# Patient Record
Sex: MALE | Race: WHITE | NOT HISPANIC OR LATINO
[De-identification: names, ages, dates, MRNs, and addresses within clinical notes are randomized per-mention and may not be internally consistent; named-entity substitution may affect disease eponyms.]

---

## 2017-01-04 ENCOUNTER — APPOINTMENT (OUTPATIENT)
Dept: ELECTROPHYSIOLOGY | Facility: CLINIC | Age: 82
End: 2017-01-04

## 2017-01-04 VITALS — OXYGEN SATURATION: 95 % | DIASTOLIC BLOOD PRESSURE: 57 MMHG | HEART RATE: 65 BPM | SYSTOLIC BLOOD PRESSURE: 97 MMHG

## 2017-01-17 ENCOUNTER — APPOINTMENT (OUTPATIENT)
Dept: PULMONOLOGY | Facility: CLINIC | Age: 82
End: 2017-01-17

## 2017-01-17 VITALS — DIASTOLIC BLOOD PRESSURE: 80 MMHG | SYSTOLIC BLOOD PRESSURE: 130 MMHG | HEART RATE: 75 BPM | OXYGEN SATURATION: 96 %

## 2017-03-08 ENCOUNTER — APPOINTMENT (OUTPATIENT)
Dept: ELECTROPHYSIOLOGY | Facility: CLINIC | Age: 82
End: 2017-03-08

## 2017-05-03 ENCOUNTER — APPOINTMENT (OUTPATIENT)
Dept: PULMONOLOGY | Facility: CLINIC | Age: 82
End: 2017-05-03

## 2017-05-03 VITALS
SYSTOLIC BLOOD PRESSURE: 120 MMHG | HEIGHT: 62 IN | DIASTOLIC BLOOD PRESSURE: 70 MMHG | HEART RATE: 79 BPM | OXYGEN SATURATION: 96 % | BODY MASS INDEX: 31.64 KG/M2

## 2017-05-03 VITALS — BODY MASS INDEX: 31.64 KG/M2 | WEIGHT: 173 LBS

## 2017-05-03 DIAGNOSIS — J96.11 CHRONIC RESPIRATORY FAILURE WITH HYPOXIA: ICD-10-CM

## 2017-05-03 RX ORDER — METHOCARBAMOL 500 MG/1
500 TABLET, FILM COATED ORAL
Qty: 30 | Refills: 0 | Status: DISCONTINUED | COMMUNITY
Start: 2017-02-23

## 2017-05-03 RX ORDER — DOXYCYCLINE 100 MG/1
100 CAPSULE ORAL
Qty: 8 | Refills: 0 | Status: DISCONTINUED | COMMUNITY
Start: 2016-12-10

## 2017-05-18 ENCOUNTER — APPOINTMENT (OUTPATIENT)
Dept: ELECTROPHYSIOLOGY | Facility: CLINIC | Age: 82
End: 2017-05-18

## 2017-05-18 VITALS — OXYGEN SATURATION: 96 % | SYSTOLIC BLOOD PRESSURE: 116 MMHG | DIASTOLIC BLOOD PRESSURE: 70 MMHG | HEART RATE: 74 BPM

## 2017-06-19 ENCOUNTER — RESULT REVIEW (OUTPATIENT)
Age: 82
End: 2017-06-19

## 2017-07-18 ENCOUNTER — APPOINTMENT (OUTPATIENT)
Dept: PULMONOLOGY | Facility: CLINIC | Age: 82
End: 2017-07-18

## 2017-07-18 VITALS
SYSTOLIC BLOOD PRESSURE: 110 MMHG | WEIGHT: 168 LBS | OXYGEN SATURATION: 96 % | DIASTOLIC BLOOD PRESSURE: 70 MMHG | HEART RATE: 72 BPM | BODY MASS INDEX: 30.91 KG/M2 | HEIGHT: 62 IN

## 2017-07-18 DIAGNOSIS — R09.89 OTHER SPECIFIED SYMPTOMS AND SIGNS INVOLVING THE CIRCULATORY AND RESPIRATORY SYSTEMS: ICD-10-CM

## 2017-07-18 DIAGNOSIS — J44.9 CHRONIC OBSTRUCTIVE PULMONARY DISEASE, UNSPECIFIED: ICD-10-CM

## 2017-07-18 DIAGNOSIS — Z87.891 PERSONAL HISTORY OF NICOTINE DEPENDENCE: ICD-10-CM

## 2017-07-18 DIAGNOSIS — R93.8 ABNORMAL FINDINGS ON DIAGNOSTIC IMAGING OF OTHER SPECIFIED BODY STRUCTURES: ICD-10-CM

## 2017-07-18 DIAGNOSIS — R06.02 SHORTNESS OF BREATH: ICD-10-CM

## 2017-07-18 DIAGNOSIS — G47.34 IDIOPATHIC SLEEP RELATED NONOBSTRUCTIVE ALVEOLAR HYPOVENTILATION: ICD-10-CM

## 2017-07-22 ENCOUNTER — INPATIENT (INPATIENT)
Facility: HOSPITAL | Age: 82
LOS: 11 days | Discharge: NURSING, REHAB OR LONG-TERM CARE | DRG: 871 | End: 2017-08-03
Attending: HOSPITALIST | Admitting: HOSPITALIST
Payer: MEDICARE

## 2017-07-22 VITALS
WEIGHT: 149.91 LBS | RESPIRATION RATE: 18 BRPM | TEMPERATURE: 92 F | DIASTOLIC BLOOD PRESSURE: 70 MMHG | OXYGEN SATURATION: 99 % | SYSTOLIC BLOOD PRESSURE: 102 MMHG | HEIGHT: 63 IN

## 2017-07-22 DIAGNOSIS — Z95.0 PRESENCE OF CARDIAC PACEMAKER: ICD-10-CM

## 2017-07-22 DIAGNOSIS — K43.9 VENTRAL HERNIA WITHOUT OBSTRUCTION OR GANGRENE: Chronic | ICD-10-CM

## 2017-07-22 DIAGNOSIS — N17.9 ACUTE KIDNEY FAILURE, UNSPECIFIED: ICD-10-CM

## 2017-07-22 DIAGNOSIS — T68.XXXA HYPOTHERMIA, INITIAL ENCOUNTER: ICD-10-CM

## 2017-07-22 DIAGNOSIS — I25.10 ATHEROSCLEROTIC HEART DISEASE OF NATIVE CORONARY ARTERY WITHOUT ANGINA PECTORIS: ICD-10-CM

## 2017-07-22 DIAGNOSIS — G30.8 OTHER ALZHEIMER'S DISEASE: ICD-10-CM

## 2017-07-22 DIAGNOSIS — R41.82 ALTERED MENTAL STATUS, UNSPECIFIED: ICD-10-CM

## 2017-07-22 DIAGNOSIS — Z90.79 ACQUIRED ABSENCE OF OTHER GENITAL ORGAN(S): Chronic | ICD-10-CM

## 2017-07-22 DIAGNOSIS — J44.9 CHRONIC OBSTRUCTIVE PULMONARY DISEASE, UNSPECIFIED: ICD-10-CM

## 2017-07-22 DIAGNOSIS — A41.9 SEPSIS, UNSPECIFIED ORGANISM: ICD-10-CM

## 2017-07-22 DIAGNOSIS — I10 ESSENTIAL (PRIMARY) HYPERTENSION: ICD-10-CM

## 2017-07-22 LAB
ALBUMIN SERPL ELPH-MCNC: 3.5 G/DL — SIGNIFICANT CHANGE UP (ref 3.3–5.2)
ALP SERPL-CCNC: 113 U/L — SIGNIFICANT CHANGE UP (ref 40–120)
ALT FLD-CCNC: 44 U/L — HIGH
ANION GAP SERPL CALC-SCNC: 16 MMOL/L — SIGNIFICANT CHANGE UP (ref 5–17)
ANISOCYTOSIS BLD QL: SLIGHT — SIGNIFICANT CHANGE UP
APPEARANCE UR: CLEAR — SIGNIFICANT CHANGE UP
APTT BLD: 40.4 SEC — HIGH (ref 27.5–37.4)
AST SERPL-CCNC: 34 U/L — SIGNIFICANT CHANGE UP
BACTERIA # UR AUTO: ABNORMAL
BASE EXCESS BLDA CALC-SCNC: -7.4 MMOL/L — LOW (ref -3–3)
BILIRUB SERPL-MCNC: 0.3 MG/DL — LOW (ref 0.4–2)
BILIRUB UR-MCNC: NEGATIVE — SIGNIFICANT CHANGE UP
BLOOD GAS COMMENTS ARTERIAL: SIGNIFICANT CHANGE UP
BUN SERPL-MCNC: 80 MG/DL — HIGH (ref 8–20)
CALCIUM SERPL-MCNC: 9.6 MG/DL — SIGNIFICANT CHANGE UP (ref 8.6–10.2)
CHLORIDE SERPL-SCNC: 112 MMOL/L — HIGH (ref 98–107)
CO2 SERPL-SCNC: 17 MMOL/L — LOW (ref 22–29)
COLOR SPEC: YELLOW — SIGNIFICANT CHANGE UP
CREAT SERPL-MCNC: 2.07 MG/DL — HIGH (ref 0.5–1.3)
DIFF PNL FLD: ABNORMAL
EPI CELLS # UR: SIGNIFICANT CHANGE UP
GAS PNL BLDA: SIGNIFICANT CHANGE UP
GLUCOSE SERPL-MCNC: 68 MG/DL — LOW (ref 70–115)
GLUCOSE UR QL: NEGATIVE MG/DL — SIGNIFICANT CHANGE UP
HCO3 BLDA-SCNC: 18 MMOL/L — LOW (ref 20–26)
HCT VFR BLD CALC: 39.2 % — LOW (ref 42–52)
HGB BLD-MCNC: 13.4 G/DL — LOW (ref 14–18)
HOROWITZ INDEX BLDA+IHG-RTO: 3 — SIGNIFICANT CHANGE UP
INR BLD: 1.13 RATIO — SIGNIFICANT CHANGE UP (ref 0.88–1.16)
KETONES UR-MCNC: NEGATIVE — SIGNIFICANT CHANGE UP
LACTATE BLDV-MCNC: 0.8 MMOL/L — SIGNIFICANT CHANGE UP (ref 0.5–2)
LEUKOCYTE ESTERASE UR-ACNC: ABNORMAL
LYMPHOCYTES # BLD AUTO: 6 % — LOW (ref 20–55)
MACROCYTES BLD QL: SLIGHT — SIGNIFICANT CHANGE UP
MCHC RBC-ENTMCNC: 31.8 PG — HIGH (ref 27–31)
MCHC RBC-ENTMCNC: 34.2 G/DL — SIGNIFICANT CHANGE UP (ref 32–36)
MCV RBC AUTO: 92.9 FL — SIGNIFICANT CHANGE UP (ref 80–94)
MICROCYTES BLD QL: SLIGHT — SIGNIFICANT CHANGE UP
MONOCYTES NFR BLD AUTO: 4 % — SIGNIFICANT CHANGE UP (ref 3–10)
NEUTROPHILS NFR BLD AUTO: 89 % — HIGH (ref 37–73)
NITRITE UR-MCNC: NEGATIVE — SIGNIFICANT CHANGE UP
PCO2 BLDA: 36 MMHG — SIGNIFICANT CHANGE UP (ref 35–45)
PH BLDA: 7.32 — LOW (ref 7.35–7.45)
PH UR: 5 — SIGNIFICANT CHANGE UP (ref 5–8)
PLAT MORPH BLD: NORMAL — SIGNIFICANT CHANGE UP
PLATELET # BLD AUTO: 87 K/UL — LOW (ref 150–400)
PO2 BLDA: 82 MMHG — LOW (ref 83–108)
POTASSIUM SERPL-MCNC: 5.4 MMOL/L — HIGH (ref 3.5–5.3)
POTASSIUM SERPL-SCNC: 5.4 MMOL/L — HIGH (ref 3.5–5.3)
PROT SERPL-MCNC: 7.3 G/DL — SIGNIFICANT CHANGE UP (ref 6.6–8.7)
PROT UR-MCNC: 15 MG/DL
PROTHROM AB SERPL-ACNC: 12.5 SEC — SIGNIFICANT CHANGE UP (ref 9.8–12.7)
RBC # BLD: 4.22 M/UL — LOW (ref 4.6–6.2)
RBC # FLD: 14.7 % — SIGNIFICANT CHANGE UP (ref 11–15.6)
RBC BLD AUTO: ABNORMAL
RBC CASTS # UR COMP ASSIST: SIGNIFICANT CHANGE UP /HPF (ref 0–4)
SAO2 % BLDA: 96 % — SIGNIFICANT CHANGE UP (ref 95–99)
SODIUM SERPL-SCNC: 145 MMOL/L — SIGNIFICANT CHANGE UP (ref 135–145)
SP GR SPEC: 1.02 — SIGNIFICANT CHANGE UP (ref 1.01–1.02)
UROBILINOGEN FLD QL: NEGATIVE MG/DL — SIGNIFICANT CHANGE UP
VARIANT LYMPHS # BLD: 1 % — SIGNIFICANT CHANGE UP (ref 0–6)
WBC # BLD: 8 K/UL — SIGNIFICANT CHANGE UP (ref 4.8–10.8)
WBC # FLD AUTO: 8 K/UL — SIGNIFICANT CHANGE UP (ref 4.8–10.8)
WBC UR QL: >50

## 2017-07-22 PROCEDURE — 93010 ELECTROCARDIOGRAM REPORT: CPT

## 2017-07-22 PROCEDURE — 71010: CPT | Mod: 26

## 2017-07-22 PROCEDURE — 70450 CT HEAD/BRAIN W/O DYE: CPT | Mod: 26

## 2017-07-22 PROCEDURE — 99291 CRITICAL CARE FIRST HOUR: CPT

## 2017-07-22 PROCEDURE — 99223 1ST HOSP IP/OBS HIGH 75: CPT | Mod: AI

## 2017-07-22 RX ORDER — SODIUM CHLORIDE 9 MG/ML
1000 INJECTION, SOLUTION INTRAVENOUS
Qty: 0 | Refills: 0 | Status: DISCONTINUED | OUTPATIENT
Start: 2017-07-22 | End: 2017-07-23

## 2017-07-22 RX ORDER — ENOXAPARIN SODIUM 100 MG/ML
30 INJECTION SUBCUTANEOUS EVERY 24 HOURS
Qty: 0 | Refills: 0 | Status: DISCONTINUED | OUTPATIENT
Start: 2017-07-22 | End: 2017-07-25

## 2017-07-22 RX ORDER — SODIUM CHLORIDE 9 MG/ML
500 INJECTION INTRAMUSCULAR; INTRAVENOUS; SUBCUTANEOUS
Qty: 0 | Refills: 0 | Status: COMPLETED | OUTPATIENT
Start: 2017-07-22 | End: 2017-07-22

## 2017-07-22 RX ORDER — SODIUM CHLORIDE 9 MG/ML
3 INJECTION INTRAMUSCULAR; INTRAVENOUS; SUBCUTANEOUS ONCE
Qty: 0 | Refills: 0 | Status: COMPLETED | OUTPATIENT
Start: 2017-07-22 | End: 2017-07-22

## 2017-07-22 RX ORDER — HYDROCORTISONE 20 MG
100 TABLET ORAL ONCE
Qty: 0 | Refills: 0 | Status: COMPLETED | OUTPATIENT
Start: 2017-07-22 | End: 2017-07-22

## 2017-07-22 RX ORDER — CEFTRIAXONE 500 MG/1
1 INJECTION, POWDER, FOR SOLUTION INTRAMUSCULAR; INTRAVENOUS ONCE
Qty: 0 | Refills: 0 | Status: COMPLETED | OUTPATIENT
Start: 2017-07-22 | End: 2017-07-22

## 2017-07-22 RX ORDER — AZITHROMYCIN 500 MG/1
500 TABLET, FILM COATED ORAL EVERY 24 HOURS
Qty: 0 | Refills: 0 | Status: DISCONTINUED | OUTPATIENT
Start: 2017-07-23 | End: 2017-07-25

## 2017-07-22 RX ORDER — AZITHROMYCIN 500 MG/1
500 TABLET, FILM COATED ORAL ONCE
Qty: 0 | Refills: 0 | Status: COMPLETED | OUTPATIENT
Start: 2017-07-22 | End: 2017-07-22

## 2017-07-22 RX ORDER — CEFTRIAXONE 500 MG/1
1 INJECTION, POWDER, FOR SOLUTION INTRAMUSCULAR; INTRAVENOUS EVERY 24 HOURS
Qty: 0 | Refills: 0 | Status: DISCONTINUED | OUTPATIENT
Start: 2017-07-23 | End: 2017-07-25

## 2017-07-22 RX ADMIN — SODIUM CHLORIDE 100 MILLILITER(S): 9 INJECTION, SOLUTION INTRAVENOUS at 23:45

## 2017-07-22 RX ADMIN — AZITHROMYCIN 255 MILLIGRAM(S): 500 TABLET, FILM COATED ORAL at 18:35

## 2017-07-22 RX ADMIN — SODIUM CHLORIDE 2000 MILLILITER(S): 9 INJECTION INTRAMUSCULAR; INTRAVENOUS; SUBCUTANEOUS at 15:06

## 2017-07-22 RX ADMIN — SODIUM CHLORIDE 100 MILLILITER(S): 9 INJECTION, SOLUTION INTRAVENOUS at 16:00

## 2017-07-22 RX ADMIN — SODIUM CHLORIDE 3 MILLILITER(S): 9 INJECTION INTRAMUSCULAR; INTRAVENOUS; SUBCUTANEOUS at 13:22

## 2017-07-22 RX ADMIN — SODIUM CHLORIDE 2000 MILLILITER(S): 9 INJECTION INTRAMUSCULAR; INTRAVENOUS; SUBCUTANEOUS at 13:16

## 2017-07-22 RX ADMIN — CEFTRIAXONE 100 GRAM(S): 500 INJECTION, POWDER, FOR SOLUTION INTRAMUSCULAR; INTRAVENOUS at 15:06

## 2017-07-22 RX ADMIN — SODIUM CHLORIDE 2000 MILLILITER(S): 9 INJECTION INTRAMUSCULAR; INTRAVENOUS; SUBCUTANEOUS at 15:01

## 2017-07-22 RX ADMIN — SODIUM CHLORIDE 2000 MILLILITER(S): 9 INJECTION INTRAMUSCULAR; INTRAVENOUS; SUBCUTANEOUS at 13:15

## 2017-07-22 RX ADMIN — Medication 100 MILLIGRAM(S): at 15:06

## 2017-07-22 RX ADMIN — SODIUM CHLORIDE 2000 MILLILITER(S): 9 INJECTION INTRAMUSCULAR; INTRAVENOUS; SUBCUTANEOUS at 13:56

## 2017-07-22 RX ADMIN — ENOXAPARIN SODIUM 30 MILLIGRAM(S): 100 INJECTION SUBCUTANEOUS at 23:46

## 2017-07-22 NOTE — ED ADULT NURSE NOTE - OBJECTIVE STATEMENT
Assumed patient care at 1454.  Family reports unable to stand X 2days due to generalized weakness.  Decreased PO intake over the past 2-3 days.  Clear bilateral lung sounds.  Abdomen soft, nontender, nondistended with positive bowel sounds X 4 quadrants.  Able to move all four extremities with equal strength.  Alert to person and time but not place or situation.  Hx of dementia.  Warming blanket in place.

## 2017-07-22 NOTE — H&P ADULT - HISTORY OF PRESENT ILLNESS
85yr old male with PMH of HTN. Dementia, Pacemaker for arrythmia, ?COPD was brought by wife to ER due to altered consciousness. At the time of my interview, no family available to provide details,  information obtained from ER physician and EMR. Pt obtunded, unable to provide any history.   In the ER , he was found to be hypothermic to 93F and Tachypneic, hypoxic on RA improved later. CXR suspicious for rt and LLL infiltrates, UA - dirty. He is being admitted for Sepsis with AMS possibly 2/2 pneumonia/ UTI. Hammonds was inserted in ER. CT head not done at the time of interview.

## 2017-07-22 NOTE — ED PROVIDER NOTE - OBJECTIVE STATEMENT
86 yo male hx of COPD and heart disease; has been home from rehab for approx 4 months, doing reasonably well; requires a lot of assitance with ADLs but can ambulate with walker, and usually appropriately interactive; family states over last three days has become exceedingly weak, tired, unable to get out of bed, not eating, not drinking; patient with mild dementia, hx otherwise limited.

## 2017-07-22 NOTE — INPATIENT CERTIFICATION FOR MEDICARE PATIENTS - RISKS OF ADVERSE EVENTS
Concern for neurologic deterioration/Concern for worsening infectious process/Concern for renal deterioration/Concern for delay in diagnosis and treatment

## 2017-07-22 NOTE — ED PROVIDER NOTE - MEDICAL DECISION MAKING DETAILS
will treat for presumed sepsis, checking tsh and treating for possible adrenal insufficency; plan to admit

## 2017-07-22 NOTE — ED ADULT NURSE NOTE - PSH
Hernia, epigastric    History of transurethral prostatectomy  transurethral needle ablation /TUNA of prostate 4 years ago

## 2017-07-22 NOTE — ED PROVIDER NOTE - CARE PLAN
Principal Discharge DX:	Hypothermia, initial encounter  Secondary Diagnosis:	Sepsis, due to unspecified organism

## 2017-07-22 NOTE — H&P ADULT - NSHPPHYSICALEXAM_GEN_ALL_CORE
PHYSICAL EXAM:    GENERAL: obtunded, not arousable to sternal rub , barely opens eyes  HEAD:  Atraumatic, Normocephalic  EYES: NAI conjunctiva and sclera clear  ENMT: dry mucous membranes   NECK: Supple, No JVD  NERVOUS SYSTEM:  Alert & Oriented X0, obtunded  CHEST/LUNG: Clear to percussion bilaterally; No rales, rhonchi, wheezing, or rubs  HEART: Regular rate and rhythm; No murmurs, rubs, or gallops  ABDOMEN: Soft, Nondistended; Bowel sounds present  EXTREMITIES:   No clubbing, cyanosis, or edema  SKIN: No obvious rashes or lesions

## 2017-07-22 NOTE — ED PROVIDER NOTE - CRITICAL CARE PROVIDED
documentation/additional history taking/interpretation of diagnostic studies/consultation with other physicians/consult w/ pt's family directly relating to pts condition/direct patient care (not related to procedure)

## 2017-07-22 NOTE — H&P ADULT - PROBLEM SELECTOR PLAN 3
as above  f/u cuiltures  ct rocephin , azithromycin for possible CAP/ UTI  ct hess to monitor urine output for now as above  f/u cultures  ct rocephin , azithromycin for possible CAP/ UTI  ct hess to monitor urine output for now

## 2017-07-22 NOTE — ED ADULT NURSE NOTE - PMH
CAD (coronary artery disease)    COPD (chronic obstructive pulmonary disease)    HLD (hyperlipidemia)    HTN (hypertension) CAD (coronary artery disease)    COPD (chronic obstructive pulmonary disease)    Dementia    HLD (hyperlipidemia)    HTN (hypertension)    Pacemaker

## 2017-07-22 NOTE — ED ADULT NURSE REASSESSMENT NOTE - NS ED NURSE REASSESS COMMENT FT1
Pt sleeping, arousable with deep tactile stimulation. Mental status unchanged since arrival.  Pt alert to person and year but not to place or situation.  Follows commands.  Turned and positioned.  Cleaned and changed.  respirations even and unlabored.  Oxygen saturation 94% with 3 liters O2 via NC.  As per family, pt uses nasal cannula at night 1-2 liters. IV fluids infusing well through patent IV catheter in Left arm. Awaiting admission orders.

## 2017-07-22 NOTE — ED PROVIDER NOTE - PMH
CAD (coronary artery disease)    COPD (chronic obstructive pulmonary disease)    HLD (hyperlipidemia)    HTN (hypertension)

## 2017-07-22 NOTE — H&P ADULT - PROBLEM SELECTOR PLAN 1
Will get stat CT head - if neg for acute findings  will admit to tele +  bed  stat ABG r/o hypercarbia  Discussed with ER anttending  ct antibiotics for possible UTI/Pneumonia with toxic metabolic encephalopathy  check repeat Lactate once warmed  monitor vitals

## 2017-07-22 NOTE — H&P ADULT - PROBLEM SELECTOR PROBLEM 8
Coronary artery disease involving native coronary artery of native heart without angina pectoris Essential hypertension

## 2017-07-22 NOTE — H&P ADULT - PMH
CAD (coronary artery disease)    COPD (chronic obstructive pulmonary disease)    Dementia    HLD (hyperlipidemia)    HTN (hypertension)    Pacemaker

## 2017-07-23 LAB
ALBUMIN SERPL ELPH-MCNC: 2.9 G/DL — LOW (ref 3.3–5.2)
ALP SERPL-CCNC: 97 U/L — SIGNIFICANT CHANGE UP (ref 40–120)
ALT FLD-CCNC: 37 U/L — SIGNIFICANT CHANGE UP
ANION GAP SERPL CALC-SCNC: 12 MMOL/L — SIGNIFICANT CHANGE UP (ref 5–17)
ANISOCYTOSIS BLD QL: SLIGHT — SIGNIFICANT CHANGE UP
AST SERPL-CCNC: 36 U/L — SIGNIFICANT CHANGE UP
BILIRUB SERPL-MCNC: 0.2 MG/DL — LOW (ref 0.4–2)
BUN SERPL-MCNC: 58 MG/DL — HIGH (ref 8–20)
CALCIUM SERPL-MCNC: 8.4 MG/DL — LOW (ref 8.6–10.2)
CHLORIDE SERPL-SCNC: 119 MMOL/L — HIGH (ref 98–107)
CO2 SERPL-SCNC: 17 MMOL/L — LOW (ref 22–29)
CREAT SERPL-MCNC: 1.65 MG/DL — HIGH (ref 0.5–1.3)
GLUCOSE SERPL-MCNC: 81 MG/DL — SIGNIFICANT CHANGE UP (ref 70–115)
HCT VFR BLD CALC: 34.3 % — LOW (ref 42–52)
HGB BLD-MCNC: 11.4 G/DL — LOW (ref 14–18)
HYPOCHROMIA BLD QL: SLIGHT — SIGNIFICANT CHANGE UP
LYMPHOCYTES # BLD AUTO: 5 % — LOW (ref 20–55)
MACROCYTES BLD QL: SLIGHT — SIGNIFICANT CHANGE UP
MCHC RBC-ENTMCNC: 30.8 PG — SIGNIFICANT CHANGE UP (ref 27–31)
MCHC RBC-ENTMCNC: 33.2 G/DL — SIGNIFICANT CHANGE UP (ref 32–36)
MCV RBC AUTO: 92.7 FL — SIGNIFICANT CHANGE UP (ref 80–94)
MICROCYTES BLD QL: SLIGHT — SIGNIFICANT CHANGE UP
MONOCYTES NFR BLD AUTO: 4 % — SIGNIFICANT CHANGE UP (ref 3–10)
NEUTROPHILS NFR BLD AUTO: 89 % — HIGH (ref 37–73)
OVALOCYTES BLD QL SMEAR: SLIGHT — SIGNIFICANT CHANGE UP
PLAT MORPH BLD: NORMAL — SIGNIFICANT CHANGE UP
PLATELET # BLD AUTO: 83 K/UL — LOW (ref 150–400)
POIKILOCYTOSIS BLD QL AUTO: SLIGHT — SIGNIFICANT CHANGE UP
POTASSIUM SERPL-MCNC: 5 MMOL/L — SIGNIFICANT CHANGE UP (ref 3.5–5.3)
POTASSIUM SERPL-SCNC: 5 MMOL/L — SIGNIFICANT CHANGE UP (ref 3.5–5.3)
PROT SERPL-MCNC: 6.1 G/DL — LOW (ref 6.6–8.7)
RBC # BLD: 3.7 M/UL — LOW (ref 4.6–6.2)
RBC # FLD: 14.6 % — SIGNIFICANT CHANGE UP (ref 11–15.6)
RBC BLD AUTO: ABNORMAL
SODIUM SERPL-SCNC: 148 MMOL/L — HIGH (ref 135–145)
VARIANT LYMPHS # BLD: 2 % — SIGNIFICANT CHANGE UP (ref 0–6)
WBC # BLD: 6.4 K/UL — SIGNIFICANT CHANGE UP (ref 4.8–10.8)
WBC # FLD AUTO: 6.4 K/UL — SIGNIFICANT CHANGE UP (ref 4.8–10.8)

## 2017-07-23 PROCEDURE — 99233 SBSQ HOSP IP/OBS HIGH 50: CPT

## 2017-07-23 RX ORDER — SODIUM CHLORIDE 9 MG/ML
1000 INJECTION, SOLUTION INTRAVENOUS
Qty: 0 | Refills: 0 | Status: DISCONTINUED | OUTPATIENT
Start: 2017-07-23 | End: 2017-07-25

## 2017-07-23 RX ORDER — IPRATROPIUM/ALBUTEROL SULFATE 18-103MCG
3 AEROSOL WITH ADAPTER (GRAM) INHALATION EVERY 6 HOURS
Qty: 0 | Refills: 0 | Status: DISCONTINUED | OUTPATIENT
Start: 2017-07-23 | End: 2017-08-03

## 2017-07-23 RX ADMIN — ENOXAPARIN SODIUM 30 MILLIGRAM(S): 100 INJECTION SUBCUTANEOUS at 23:49

## 2017-07-23 RX ADMIN — SODIUM CHLORIDE 100 MILLILITER(S): 9 INJECTION, SOLUTION INTRAVENOUS at 14:33

## 2017-07-23 RX ADMIN — CEFTRIAXONE 100 GRAM(S): 500 INJECTION, POWDER, FOR SOLUTION INTRAMUSCULAR; INTRAVENOUS at 18:27

## 2017-07-23 RX ADMIN — AZITHROMYCIN 255 MILLIGRAM(S): 500 TABLET, FILM COATED ORAL at 18:27

## 2017-07-23 NOTE — PROGRESS NOTE ADULT - SUBJECTIVE AND OBJECTIVE BOX
TONIA ABDUL    [id number]    85y      Male    CC: AMS, weakness, lethargy found to be hypothermic yesterday      INTERVAL HPI/OVERNIGHT EVENTS: no acute events    REVIEW OF SYSTEMS:  per wife    CONSTITUTIONAL: No fever  RESPIRATORY: No cough,No shortness of breath  GASTROINTESTINAL: No abdominal or epigastric pain. No nausea, vomiting  :      Vital Signs Last 24 Hrs  T(C): 36.4 (2017 07:39), Max: 36.5 (2017 04:51)  T(F): 97.5 (2017 07:39), Max: 97.7 (2017 04:51)  HR: 59 (2017 12:31) (59 - 70)  BP: 110/68 (2017 12:31) (84/66 - 117/62)  BP(mean): 71 (2017 08:18) (71 - 71)  RR: 13 (2017 12:31) (13 - 20)  SpO2: 94% (2017 12:31) (89% - 96%)    PHYSICAL EXAM:    GENERAL: lethargiv opens eyes - follows commands inconsistently  HEENT: PERRL, +EOMI  CHEST/LUNG: Clear to percussion bilaterally; No wheezing  HEART: S1S2+, Regular rate and rhythm; No murmurs, rubs, or gallops  ABDOMEN: Soft, Nontender, Nondistended; Bowel sounds present  EXTREMITIES:  No clubbing, cyanosis, or edema  SKIN: No rashes or lesions  NEURO: AAOX1-2 - to self /wife       @ : @ 07:00  --------------------------------------------------------  IN: 3300 mL / OUT: 2050 mL / NET: 1250 mL     @ : @ 13:42  --------------------------------------------------------  IN: 200 mL / OUT: 100 mL / NET: 100 mL        LABS:                        11.4   6.4   )-----------( 83       ( 2017 05:48 )             34.3     -    148<H>  |  119<H>  |  58.0<H>  ----------------------------<  81  5.0   |  17.0<L>  |  1.65<H>    Ca    8.4<L>      2017 05:48    TPro  6.1<L>  /  Alb  2.9<L>  /  TBili  0.2<L>  /  DBili  x   /  AST  36  /  ALT  37  /  AlkPhos  97  -    PT/INR - ( 2017 13:14 )   PT: 12.5 sec;   INR: 1.13 ratio         PTT - ( 2017 13:14 )  PTT:40.4 sec  Urinalysis Basic - ( 2017 15:45 )    Color: Yellow / Appearance: Clear / S.020 / pH: x  Gluc: x / Ketone: Negative  / Bili: Negative / Urobili: Negative mg/dL   Blood: x / Protein: 15 mg/dL / Nitrite: Negative   Leuk Esterase: Moderate / RBC: 0-2 /HPF / WBC >50   Sq Epi: x / Non Sq Epi: x / Bacteria: Few          MEDICATIONS  (STANDING):  cefTRIAXone   IVPB 1 Gram(s) IV Intermittent every 24 hours  azithromycin  IVPB 500 milliGRAM(s) IV Intermittent every 24 hours  enoxaparin Injectable 30 milliGRAM(s) SubCutaneous every 24 hours  sodium chloride 0.45%. 1000 milliLiter(s) (100 mL/Hr) IV Continuous <Continuous>    MEDICATIONS  (PRN):      RADIOLOGY & ADDITIONAL TESTS:  CT head - reviewed

## 2017-07-23 NOTE — PROGRESS NOTE ADULT - ASSESSMENT
85yr old male with PMH of HTN. Dementia, Pacemaker for arrythmia, ?COPD was brought by wife to ER due to altered consciousness. At the time of my interview, no family available to provide details,  information obtained from ER physician and EMR. Pt obtunded, unable to provide any history.   In the ER , he was found to be hypothermic to 93F and Tachypneic, hypoxic on RA improved later. CXR suspicious for rt and LLL infiltrates, UA - dirty. He is being admitted for Sepsis with AMS possibly 2/2 pneumonia/ UTI. Hammonds was inserted in ER. CT head neg for acute infarct. some awake today

## 2017-07-24 LAB
ANION GAP SERPL CALC-SCNC: 14 MMOL/L — SIGNIFICANT CHANGE UP (ref 5–17)
BUN SERPL-MCNC: 34 MG/DL — HIGH (ref 8–20)
CALCIUM SERPL-MCNC: 8.6 MG/DL — SIGNIFICANT CHANGE UP (ref 8.6–10.2)
CHLORIDE SERPL-SCNC: 114 MMOL/L — HIGH (ref 98–107)
CO2 SERPL-SCNC: 17 MMOL/L — LOW (ref 22–29)
CREAT SERPL-MCNC: 1.26 MG/DL — SIGNIFICANT CHANGE UP (ref 0.5–1.3)
GLUCOSE SERPL-MCNC: 70 MG/DL — SIGNIFICANT CHANGE UP (ref 70–115)
HCT VFR BLD CALC: 37 % — LOW (ref 42–52)
HGB BLD-MCNC: 12.3 G/DL — LOW (ref 14–18)
MAGNESIUM SERPL-MCNC: 1.4 MG/DL — LOW (ref 1.6–2.6)
MCHC RBC-ENTMCNC: 31.4 PG — HIGH (ref 27–31)
MCHC RBC-ENTMCNC: 33.2 G/DL — SIGNIFICANT CHANGE UP (ref 32–36)
MCV RBC AUTO: 94.4 FL — HIGH (ref 80–94)
PHOSPHATE SERPL-MCNC: 2 MG/DL — LOW (ref 2.4–4.7)
PLATELET # BLD AUTO: 85 K/UL — LOW (ref 150–400)
POTASSIUM SERPL-MCNC: 4.8 MMOL/L — SIGNIFICANT CHANGE UP (ref 3.5–5.3)
POTASSIUM SERPL-SCNC: 4.8 MMOL/L — SIGNIFICANT CHANGE UP (ref 3.5–5.3)
RBC # BLD: 3.92 M/UL — LOW (ref 4.6–6.2)
RBC # FLD: 14.4 % — SIGNIFICANT CHANGE UP (ref 11–15.6)
SODIUM SERPL-SCNC: 145 MMOL/L — SIGNIFICANT CHANGE UP (ref 135–145)
WBC # BLD: 5.8 K/UL — SIGNIFICANT CHANGE UP (ref 4.8–10.8)
WBC # FLD AUTO: 5.8 K/UL — SIGNIFICANT CHANGE UP (ref 4.8–10.8)

## 2017-07-24 PROCEDURE — 93306 TTE W/DOPPLER COMPLETE: CPT | Mod: 26

## 2017-07-24 PROCEDURE — 99223 1ST HOSP IP/OBS HIGH 75: CPT

## 2017-07-24 PROCEDURE — 99233 SBSQ HOSP IP/OBS HIGH 50: CPT

## 2017-07-24 PROCEDURE — 93010 ELECTROCARDIOGRAM REPORT: CPT

## 2017-07-24 RX ORDER — NYSTATIN CREAM 100000 [USP'U]/G
1 CREAM TOPICAL
Qty: 0 | Refills: 0 | Status: DISCONTINUED | OUTPATIENT
Start: 2017-07-24 | End: 2017-08-03

## 2017-07-24 RX ORDER — MAGNESIUM OXIDE 400 MG ORAL TABLET 241.3 MG
400 TABLET ORAL
Qty: 0 | Refills: 0 | Status: DISCONTINUED | OUTPATIENT
Start: 2017-07-24 | End: 2017-08-03

## 2017-07-24 RX ORDER — MAGNESIUM SULFATE 500 MG/ML
2 VIAL (ML) INJECTION ONCE
Qty: 0 | Refills: 0 | Status: COMPLETED | OUTPATIENT
Start: 2017-07-24 | End: 2017-07-24

## 2017-07-24 RX ORDER — SODIUM CHLORIDE 9 MG/ML
500 INJECTION INTRAMUSCULAR; INTRAVENOUS; SUBCUTANEOUS ONCE
Qty: 0 | Refills: 0 | Status: COMPLETED | OUTPATIENT
Start: 2017-07-24 | End: 2017-07-24

## 2017-07-24 RX ADMIN — NYSTATIN CREAM 1 APPLICATION(S): 100000 CREAM TOPICAL at 17:18

## 2017-07-24 RX ADMIN — ENOXAPARIN SODIUM 30 MILLIGRAM(S): 100 INJECTION SUBCUTANEOUS at 22:38

## 2017-07-24 RX ADMIN — AZITHROMYCIN 255 MILLIGRAM(S): 500 TABLET, FILM COATED ORAL at 17:56

## 2017-07-24 RX ADMIN — MAGNESIUM OXIDE 400 MG ORAL TABLET 400 MILLIGRAM(S): 241.3 TABLET ORAL at 17:27

## 2017-07-24 RX ADMIN — CEFTRIAXONE 100 GRAM(S): 500 INJECTION, POWDER, FOR SOLUTION INTRAMUSCULAR; INTRAVENOUS at 17:18

## 2017-07-24 RX ADMIN — Medication 50 GRAM(S): at 15:20

## 2017-07-24 RX ADMIN — SODIUM CHLORIDE 2000 MILLILITER(S): 9 INJECTION INTRAMUSCULAR; INTRAVENOUS; SUBCUTANEOUS at 14:17

## 2017-07-24 RX ADMIN — SODIUM CHLORIDE 100 MILLILITER(S): 9 INJECTION, SOLUTION INTRAVENOUS at 00:14

## 2017-07-24 RX ADMIN — SODIUM CHLORIDE 100 MILLILITER(S): 9 INJECTION, SOLUTION INTRAVENOUS at 22:39

## 2017-07-24 NOTE — PHYSICAL THERAPY INITIAL EVALUATION ADULT - ADDITIONAL COMMENTS
pt.'s social and PLOF history obtained from the care coordination document; unable to obtain from pt.

## 2017-07-24 NOTE — SWALLOW BEDSIDE ASSESSMENT ADULT - ORAL PHASE
Delayed oral transit time/oral holding due to reduced cognition oral holding due to reduced cognition/Delayed oral transit time Within functional limits

## 2017-07-24 NOTE — PHYSICAL THERAPY INITIAL EVALUATION ADULT - DISCHARGE DISPOSITION, PT EVAL
[facility] vs. Home with assist & PT (if family able to provide Min. A with all functional mobility)/rehabilitation facility

## 2017-07-24 NOTE — CONSULT NOTE ADULT - SUBJECTIVE AND OBJECTIVE BOX
Patient is a 85y old  Male who presents with a chief complaint of feeling generally ill (2017 23:06)      HPI:  85yr old male with PMH of HTN. Dementia, Pacemaker for arrythmia, ?COPD was brought by wife to ER due to altered consciousness. At the time of my interview, no family available to provide details,  information obtained from ER physician and EMR. Pt obtunded, unable to provide any history.   In the ER , he was found to be hypothermic to 93F and Tachypneic, hypoxic on RA improved later. CXR suspicious for rt and LLL infiltrates, UA - dirty. He is being admitted for Sepsis with AMS possibly 2/2 pneumonia/ UTI. Hammonds was inserted in ER. CT head not done at the time of interview. (2017 18:27)      PAST MEDICAL & SURGICAL HISTORY:  Pacemaker  Dementia  CAD (coronary artery disease)  HLD (hyperlipidemia)  HTN (hypertension)  COPD (chronic obstructive pulmonary disease)  History of transurethral prostatectomy: transurethral needle ablation /TUNA of prostate 4 years ago  Hernia, epigastric      PREVIOUS DIAGNOSTIC TESTING:      ECHO  FINDINGS:        CATHETERIZATION  FINDINGS:      Allergies    Sulfur Precipitated (Hives)    Intolerances        MEDICATIONS  (STANDING):  cefTRIAXone   IVPB 1 Gram(s) IV Intermittent every 24 hours  azithromycin  IVPB 500 milliGRAM(s) IV Intermittent every 24 hours  enoxaparin Injectable 30 milliGRAM(s) SubCutaneous every 24 hours  sodium chloride 0.45%. 1000 milliLiter(s) (100 mL/Hr) IV Continuous <Continuous>  magnesium oxide 400 milliGRAM(s) Oral three times a day with meals  nystatin Powder 1 Application(s) Topical two times a day    MEDICATIONS  (PRN):  ALBUTerol/ipratropium for Nebulization 3 milliLiter(s) Nebulizer every 6 hours PRN Shortness of Breath and/or Wheezing      FAMILY HISTORY:  No pertinent family history in first degree relatives      SOCIAL HISTORY:    CIGARETTES:    ALCOHOL:    REVIEW OF SYSTEMS:  CONSTITUTIONAL: No fever, weight loss, or fatigue  EYES: No eye pain, visual disturbances, or discharge  ENMT:  No difficulty hearing, tinnitus, vertigo; No sinus or throat pain  NECK: No pain or stiffness  RESPIRATORY: No cough, wheezing, chills or hemoptysis; No Shortness of Breath  CARDIOVASCULAR: No chest pain, palpitations, passing out, dizziness, or leg swelling  GASTROINTESTINAL: No abdominal or epigastric pain. No nausea, vomiting, or hematemesis; No diarrhea or constipation. No melena or hematochezia.  GENITOURINARY: No dysuria, frequency, hematuria, or incontinence  NEUROLOGICAL: No headaches, memory loss, loss of strength, numbness, or tremors  SKIN: No itching, burning, rashes, or lesions   LYMPH Nodes: No enlarged glands  ENDOCRINE: No heat or cold intolerance; No hair loss  MUSCULOSKELETAL: No joint pain or swelling; No muscle, back, or extremity pain  PSYCHIATRIC: No depression, anxiety, mood swings, or difficulty sleeping  HEME/LYMPH: No easy bruising, or bleeding gums  ALLERY AND IMMUNOLOGIC: No hives or eczema	    Vital Signs Last 24 Hrs  T(C): 36.3 (2017 16:08), Max: 37.1 (2017 04:48)  T(F): 97.4 (2017 16:08), Max: 98.7 (2017 04:48)  HR: 59 (2017 15:23) (59 - 67)  BP: 119/78 (2017 15:23) (74/48 - 119/78)  BP(mean): --  RR: 15 (2017 15:23) (14 - 22)  SpO2: 95% (2017 15:23) (92% - 98%)    Daily     Daily Weight in k.5 (2017 10:53)    I&O's Detail    2017 07:01  -  2017 07:00  --------------------------------------------------------  IN:    dextrose 5% + sodium chloride 0.45%.: 400 mL    IV PiggyBack: 300 mL    Oral Fluid: 120 mL    sodium chloride 0.45%.: 1600 mL  Total IN: 2420 mL    OUT:    Indwelling Catheter - Urethral: 3000 mL  Total OUT: 3000 mL    Total NET: -580 mL      2017 07:01  -  2017 16:41  --------------------------------------------------------  IN:    IV PiggyBack: 50 mL    Oral Fluid: 220 mL    sodium chloride 0.45%.: 700 mL    Solution: 500 mL  Total IN: 1470 mL    OUT:    Indwelling Catheter - Urethral: 575 mL  Total OUT: 575 mL    Total NET: 895 mL          PHYSICAL EXAM:  Appearance: Normal, well nourished	  HEENT:   Normal oral mucosa, PERRL, EOMI, sclera non-icteric	  Lymphatic: No cervical lymphadenopathy  Cardiovascular: Normal S1 S2, No JVD, No cardiac murmurs, No carotid bruits, No peripheral edema  Respiratory: Lungs clear to auscultation	  Psychiatry: A & O x 3, Mood & affect appropriate  Gastrointestinal:  Soft, Non-tender, + BS, no bruits	  Skin: No rashes, No ecchymoses, No cyanosis  Neurologic: Grossly non-focal with full strength in all four extremities  Extremities: Normal range of motion, No clubbing, cyanosis or edema  Vascular: Peripheral pulses palpable 2+ bilaterally      INTERPRETATION OF TELEMETRY:    ECG:    LABS:                        12.3   5.8   )-----------( 85       ( 2017 07:16 )             37.0     07-    145  |  114<H>  |  34.0<H>  ----------------------------<  70  4.8   |  17.0<L>  |  1.26    Ca    8.6      2017 07:16  Phos  2.0       Mg     1.4         TPro  6.1<L>  /  Alb  2.9<L>  /  TBili  0.2<L>  /  DBili  x   /  AST  36  /  ALT  37  /  AlkPhos  97              I&O's Summary    2017 07:  -  2017 07:00  --------------------------------------------------------  IN: 2420 mL / OUT: 3000 mL / NET: -580 mL    2017 07:  -  2017 16:41  --------------------------------------------------------  IN: 1470 mL / OUT: 575 mL / NET: 895 mL      BNP  RADIOLOGY & ADDITIONAL STUDIES: Patient is a 85y old  Male who presents with a chief complaint of feeling generally ill (2017 23:06)      HPI:  85yr old male with PMH of HTN. 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 Patient started on broad spectrum abx with clinical improvement. On telemetry episodes of ApVP with safety pacing due to PVCs in blanking period. PPM interrogation also noted for short   runs of  NSVT.  Patient currently with limited functional status >4 METs but uses walker for gait support. Pacemaker interrogation reveals normal device function. patient currently Ao x3 . He deneis; fever , chills,  chest pain orthopnea , PND (+) b/l trace edema (+) polyuria x 1 week.  All systems reviewed otherwise negative except as above.       PAST MEDICAL & SURGICAL HISTORY:  Pacemaker  Dementia  CAD (coronary artery disease)  HLD (hyperlipidemia)  HTN (hypertension)  COPD (chronic obstructive pulmonary disease)  History of transurethral prostatectomy: transurethral needle ablation /TUNA of prostate 4 years ago  Hernia, epigastric      PREVIOUS DIAGNOSTIC TESTING:      ECHO  FINDINGS:< from: TTE Echo Complete w/Doppler (16 @ 11:45) >     Summary:   1. The left atrium is normal in size.   2. Normal wall motion. Left ventricular ejection fraction, by visual   estimation, is 55 to 60%. Grade II diastolic dysfunction.   3. Elevated left atrial and left ventricular end-diastolic pressures.   (LAP = 25-34 mm Hg)   4. The right atrium is normal in size.   5. Normal right ventricular size and function.   6. Mild aortic regurgitation.   7. Estimated pulmonary artery systolic pressure is 40.2 mmHg-mild   pulmonary hypertension.   8. There is no evidence of pericardial effusion.     MD Ignacia Electronically signed on 2016 at 3:07:23 PM    < end of copied text >          CATHETERIZATION  FINDINGS:      Allergies    Sulfur Precipitated (Hives)    Intolerances        MEDICATIONS  (STANDING):  cefTRIAXone   IVPB 1 Gram(s) IV Intermittent every 24 hours  azithromycin  IVPB 500 milliGRAM(s) IV Intermittent every 24 hours  enoxaparin Injectable 30 milliGRAM(s) SubCutaneous every 24 hours  sodium chloride 0.45%. 1000 milliLiter(s) (100 mL/Hr) IV Continuous <Continuous>  magnesium oxide 400 milliGRAM(s) Oral three times a day with meals  nystatin Powder 1 Application(s) Topical two times a day    MEDICATIONS  (PRN):  ALBUTerol/ipratropium for Nebulization 3 milliLiter(s) Nebulizer every 6 hours PRN Shortness of Breath and/or Wheezing      FAMILY HISTORY:  No pertinent family history in first degree relatives      SOCIAL HISTORY:    CIGARETTES:    ALCOHOL:    REVIEW OF SYSTEMS:  CONSTITUTIONAL: No fever, weight loss, or fatigue  EYES: No eye pain, visual disturbances, or discharge  ENMT:  No difficulty hearing, tinnitus, vertigo; No sinus or throat pain  NECK: No pain or stiffness  RESPIRATORY: No cough, wheezing, chills or hemoptysis; No Shortness of Breath  CARDIOVASCULAR: No chest pain, palpitations, passing out, dizziness, or leg swelling  GASTROINTESTINAL: No abdominal or epigastric pain. No nausea, vomiting, or hematemesis; No diarrhea or constipation. No melena or hematochezia.  GENITOURINARY: No dysuria, frequency, hematuria, or incontinence  NEUROLOGICAL: No headaches, memory loss, loss of strength, numbness, or tremors  SKIN: No itching, burning, rashes, or lesions   LYMPH Nodes: No enlarged glands  ENDOCRINE: No heat or cold intolerance; No hair loss  MUSCULOSKELETAL: No joint pain or swelling; No muscle, back, or extremity pain  PSYCHIATRIC: No depression, anxiety, mood swings, or difficulty sleeping  HEME/LYMPH: No easy bruising, or bleeding gums  ALLERY AND IMMUNOLOGIC: No hives or eczema	    Vital Signs Last 24 Hrs  T(C): 36.3 (2017 16:08), Max: 37.1 (2017 04:48)  T(F): 97.4 (2017 16:08), Max: 98.7 (2017 04:48)  HR: 59 (2017 15:23) (59 - 67)  BP: 119/78 (2017 15:23) (74/48 - 119/78)  BP(mean): --  RR: 15 (2017 15:23) (14 - 22)  SpO2: 95% (2017 15:23) (92% - 98%)    Daily     Daily Weight in k.5 (2017 10:53)    I&O's Detail    2017 07:01  -  2017 07:00  --------------------------------------------------------  IN:    dextrose 5% + sodium chloride 0.45%.: 400 mL    IV PiggyBack: 300 mL    Oral Fluid: 120 mL    sodium chloride 0.45%.: 1600 mL  Total IN: 2420 mL    OUT:    Indwelling Catheter - Urethral: 3000 mL  Total OUT: 3000 mL    Total NET: -580 mL      2017 07:  -  2017 16:41  --------------------------------------------------------  IN:    IV PiggyBack: 50 mL    Oral Fluid: 220 mL    sodium chloride 0.45%.: 700 mL    Solution: 500 mL  Total IN: 1470 mL    OUT:    Indwelling Catheter - Urethral: 575 mL  Total OUT: 575 mL    Total NET: 895 mL          PHYSICAL EXAM:  Appearance: Normal, well nourished	  HEENT:   Normal oral mucosa, PERRL, EOMI, sclera non-icteric	  Lymphatic: No cervical lymphadenopathy  Cardiovascular: Normal S1 S2, No JVD, No cardiac murmurs, No carotid bruits, No peripheral edema  Respiratory: Lungs clear to auscultation	  Psychiatry: A & O x 3, Mood & affect appropriate  Gastrointestinal:  Soft, Non-tender, + BS, no bruits	  Skin: No rashes, No ecchymoses, No cyanosis  Neurologic: Grossly non-focal with full strength in all four extremities  Extremities: Normal range of motion, No clubbing, cyanosis or edema  Vascular: Peripheral pulses palpable 2+ bilaterally      INTERPRETATION OF TELEMETRY:    ECG:AsVP    LABS:                        12.3   5.8   )-----------( 85       ( 2017 07:16 )             37.0     -    145  |  114<H>  |  34.0<H>  ----------------------------<  70  4.8   |  17.0<L>  |  1.26    Ca    8.6      2017 07:16  Phos  2.0       Mg     1.4         TPro  6.1<L>  /  Alb  2.9<L>  /  TBili  0.2<L>  /  DBili  x   /  AST  36  /  ALT  37  /  AlkPhos  97              I&O's Summary    2017 07:  -  2017 07:00  --------------------------------------------------------  IN: 2420 mL / OUT: 3000 mL / NET: -580 mL    2017 07:  -  2017 16:41  --------------------------------------------------------  IN: 1470 mL / OUT: 575 mL / NET: 895 mL      BNP  RADIOLOGY & ADDITIONAL STUDIES:

## 2017-07-24 NOTE — SWALLOW BEDSIDE ASSESSMENT ADULT - SLP GENERAL OBSERVATIONS
Pt received & seen seated upright in bed, +RN Calvin & spouse present, +awake/alert,+O2 nasal canula, +poor orientation & reduced cognition

## 2017-07-24 NOTE — PROGRESS NOTE ADULT - ASSESSMENT
85yr old male with PMH of HTN. Dementia, Pacemaker for arrythmia, ?COPD was brought by wife to ER due to altered consciousness. At the time of my interview, no family available to provide details,  information obtained from ER physician and EMR. Pt obtunded, unable to provide any history.   In the ER , he was found to be hypothermic to 93F and Tachypneic, hypoxic on RA improved later. CXR suspicious for rt and LLL infiltrates, UA - dirty. He is being admitted for Sepsis with AMS possibly 2/2 pneumonia/ UTI. Hammonds was inserted in ER. CT head neg for acute infarct. More awake , e/o Gram neg UTI on culture.   showing e/o NSVT and frequent PVCs - Cardiology consulted - Will get ECHO.

## 2017-07-24 NOTE — SWALLOW BEDSIDE ASSESSMENT ADULT - ASR SWALLOW ASPIRATION MONITOR
upper respiratory infection/fever/pneumonia/throat clearing/change of breathing pattern/cough/gurgly voice/oral hygiene/position upright (89Y)

## 2017-07-24 NOTE — PROGRESS NOTE ADULT - SUBJECTIVE AND OBJECTIVE BOX
TONIA ABDUL    [id number]    85y      Male    CC: came in with AMS, lethargic, found to have UTI/aspiration PNA  feels better today. awake, talking appropriately       INTERVAL HPI/OVERNIGHT EVENTS: no acute events. confused overnight, pulling at lines    REVIEW OF SYSTEMS:    CONSTITUTIONAL: +fatigue  RESPIRATORY: No cough,  No shortness of breath  GASTROINTESTINAL: No abdominal or epigastric pain. No nausea, vomiting        Vital Signs Last 24 Hrs  T(C): 36.1 (2017 10:25), Max: 37.1 (2017 04:48)  T(F): 97 (2017 10:25), Max: 98.7 (2017 04:48)  HR: 61 (2017 14:30) (60 - 67)  BP: 87/50 (2017 14:30) (74/48 - 114/80)  BP(mean): --  RR: 16 (2017 14:30) (14 - 22)  SpO2: 96% (2017 14:30) (92% - 98%)    Telemetry - reviewed - NSVT, PVCs frequent    PHYSICAL EXAM:    GENERAL: lethargic but more awake and alert today, appropriately answering  HEENT: PERRL, +EOMI  NECK: soft, Supple  CHEST/LUNG: Clear to percussion bilaterally; No wheezing  HEART: S1S2+, Regular rate and rhythm; No murmurs, rubs, or gallops  ABDOMEN: Soft, Nontender, Nondistended  EXTREMITIES:  No clubbing, cyanosis, or edema  NEURO: AAOX3, no focal deficits         @ : @ 07:00  --------------------------------------------------------  IN: 2420 mL / OUT: 3000 mL / NET: -580 mL     @ : @ 14:50  --------------------------------------------------------  IN: 1420 mL / OUT: 575 mL / NET: 845 mL        LABS:                        12.3   5.8   )-----------( 85       ( 2017 07:16 )             37.0     07-    145  |  114<H>  |  34.0<H>  ----------------------------<  70  4.8   |  17.0<L>  |  1.26    Ca    8.6      2017 07:16  Phos  2.0       Mg     1.4         TPro  6.1<L>  /  Alb  2.9<L>  /  TBili  0.2<L>  /  DBili  x   /  AST  36  /  ALT  37  /  AlkPhos  97  07      Urinalysis Basic - ( 2017 15:45 )    Color: Yellow / Appearance: Clear / S.020 / pH: x  Gluc: x / Ketone: Negative  / Bili: Negative / Urobili: Negative mg/dL   Blood: x / Protein: 15 mg/dL / Nitrite: Negative   Leuk Esterase: Moderate / RBC: 0-2 /HPF / WBC >50   Sq Epi: x / Non Sq Epi: x / Bacteria: Few          MEDICATIONS  (STANDING):  cefTRIAXone   IVPB 1 Gram(s) IV Intermittent every 24 hours  azithromycin  IVPB 500 milliGRAM(s) IV Intermittent every 24 hours  enoxaparin Injectable 30 milliGRAM(s) SubCutaneous every 24 hours  sodium chloride 0.45%. 1000 milliLiter(s) (100 mL/Hr) IV Continuous <Continuous>  magnesium sulfate  IVPB 2 Gram(s) IV Intermittent once  magnesium oxide 400 milliGRAM(s) Oral three times a day with meals    MEDICATIONS  (PRN):  ALBUTerol/ipratropium for Nebulization 3 milliLiter(s) Nebulizer every 6 hours PRN Shortness of Breath and/or Wheezing      RADIOLOGY & ADDITIONAL TESTS:

## 2017-07-24 NOTE — SWALLOW BEDSIDE ASSESSMENT ADULT - SWALLOW EVAL: DIAGNOSIS
Reduced cognition resulting in a mild to moderate oral dysphagia, further impacted by dentition. Pharyngeal dysphagia suspected with thin fluids, with +overt s/s of aspiration post intake

## 2017-07-24 NOTE — DIETITIAN INITIAL EVALUATION ADULT. - SIGNS/SYMPTOMS
as evidenced by infection, impaired skin integrity, impaired ability to chew and swallow, and PO~30%

## 2017-07-24 NOTE — SWALLOW BEDSIDE ASSESSMENT ADULT - SWALLOW EVAL: RECOMMENDED FEEDING/EATING TECHNIQUES
check mouth frequently for oral residue/pocketing/oral hygiene/small sips/bites/crush medication (when feasible)/maintain upright posture during/after eating for 30 mins/position upright (90 degrees)

## 2017-07-24 NOTE — DIETITIAN INITIAL EVALUATION ADULT. - OTHER INFO
Pt found lethargic, unable to conduct interview or obtain complete history, PO intake when fed is ~30% per aide.

## 2017-07-25 DIAGNOSIS — I50.21 ACUTE SYSTOLIC (CONGESTIVE) HEART FAILURE: ICD-10-CM

## 2017-07-25 LAB
-  AMPICILLIN: SIGNIFICANT CHANGE UP
-  NITROFURANTOIN: SIGNIFICANT CHANGE UP
-  TETRACYCLINE: SIGNIFICANT CHANGE UP
-  VANCOMYCIN: SIGNIFICANT CHANGE UP
ANION GAP SERPL CALC-SCNC: 14 MMOL/L — SIGNIFICANT CHANGE UP (ref 5–17)
BUN SERPL-MCNC: 28 MG/DL — HIGH (ref 8–20)
CALCIUM SERPL-MCNC: 8.8 MG/DL — SIGNIFICANT CHANGE UP (ref 8.6–10.2)
CHLORIDE SERPL-SCNC: 108 MMOL/L — HIGH (ref 98–107)
CO2 SERPL-SCNC: 19 MMOL/L — LOW (ref 22–29)
CREAT SERPL-MCNC: 1.17 MG/DL — SIGNIFICANT CHANGE UP (ref 0.5–1.3)
CULTURE RESULTS: SIGNIFICANT CHANGE UP
GLUCOSE SERPL-MCNC: 80 MG/DL — SIGNIFICANT CHANGE UP (ref 70–115)
HCT VFR BLD CALC: 36.7 % — LOW (ref 42–52)
HGB BLD-MCNC: 12.3 G/DL — LOW (ref 14–18)
MCHC RBC-ENTMCNC: 31.1 PG — HIGH (ref 27–31)
MCHC RBC-ENTMCNC: 33.5 G/DL — SIGNIFICANT CHANGE UP (ref 32–36)
MCV RBC AUTO: 92.9 FL — SIGNIFICANT CHANGE UP (ref 80–94)
METHOD TYPE: SIGNIFICANT CHANGE UP
ORGANISM # SPEC MICROSCOPIC CNT: SIGNIFICANT CHANGE UP
ORGANISM # SPEC MICROSCOPIC CNT: SIGNIFICANT CHANGE UP
PLATELET # BLD AUTO: 90 K/UL — LOW (ref 150–400)
POTASSIUM SERPL-MCNC: 4.6 MMOL/L — SIGNIFICANT CHANGE UP (ref 3.5–5.3)
POTASSIUM SERPL-SCNC: 4.6 MMOL/L — SIGNIFICANT CHANGE UP (ref 3.5–5.3)
RBC # BLD: 3.95 M/UL — LOW (ref 4.6–6.2)
RBC # FLD: 14.2 % — SIGNIFICANT CHANGE UP (ref 11–15.6)
SODIUM SERPL-SCNC: 141 MMOL/L — SIGNIFICANT CHANGE UP (ref 135–145)
SPECIMEN SOURCE: SIGNIFICANT CHANGE UP
WBC # BLD: 4.9 K/UL — SIGNIFICANT CHANGE UP (ref 4.8–10.8)
WBC # FLD AUTO: 4.9 K/UL — SIGNIFICANT CHANGE UP (ref 4.8–10.8)

## 2017-07-25 PROCEDURE — 99233 SBSQ HOSP IP/OBS HIGH 50: CPT

## 2017-07-25 RX ORDER — ACETAMINOPHEN 500 MG
650 TABLET ORAL EVERY 6 HOURS
Qty: 0 | Refills: 0 | Status: DISCONTINUED | OUTPATIENT
Start: 2017-07-25 | End: 2017-08-03

## 2017-07-25 RX ORDER — AMPICILLIN TRIHYDRATE 250 MG
1 CAPSULE ORAL EVERY 6 HOURS
Qty: 0 | Refills: 0 | Status: DISCONTINUED | OUTPATIENT
Start: 2017-07-25 | End: 2017-07-26

## 2017-07-25 RX ORDER — TAMSULOSIN HYDROCHLORIDE 0.4 MG/1
0.4 CAPSULE ORAL AT BEDTIME
Qty: 0 | Refills: 0 | Status: DISCONTINUED | OUTPATIENT
Start: 2017-07-25 | End: 2017-08-03

## 2017-07-25 RX ADMIN — SODIUM CHLORIDE 100 MILLILITER(S): 9 INJECTION, SOLUTION INTRAVENOUS at 08:09

## 2017-07-25 RX ADMIN — TAMSULOSIN HYDROCHLORIDE 0.4 MILLIGRAM(S): 0.4 CAPSULE ORAL at 23:41

## 2017-07-25 RX ADMIN — Medication 108 GRAM(S): at 23:41

## 2017-07-25 RX ADMIN — SODIUM CHLORIDE 100 MILLILITER(S): 9 INJECTION, SOLUTION INTRAVENOUS at 02:06

## 2017-07-25 RX ADMIN — MAGNESIUM OXIDE 400 MG ORAL TABLET 400 MILLIGRAM(S): 241.3 TABLET ORAL at 11:49

## 2017-07-25 RX ADMIN — NYSTATIN CREAM 1 APPLICATION(S): 100000 CREAM TOPICAL at 05:53

## 2017-07-25 RX ADMIN — NYSTATIN CREAM 1 APPLICATION(S): 100000 CREAM TOPICAL at 17:02

## 2017-07-25 RX ADMIN — MAGNESIUM OXIDE 400 MG ORAL TABLET 400 MILLIGRAM(S): 241.3 TABLET ORAL at 08:09

## 2017-07-25 RX ADMIN — MAGNESIUM OXIDE 400 MG ORAL TABLET 400 MILLIGRAM(S): 241.3 TABLET ORAL at 17:02

## 2017-07-25 RX ADMIN — Medication 650 MILLIGRAM(S): at 02:06

## 2017-07-25 RX ADMIN — Medication 108 GRAM(S): at 17:01

## 2017-07-25 RX ADMIN — Medication 650 MILLIGRAM(S): at 03:06

## 2017-07-25 NOTE — PROGRESS NOTE ADULT - ASSESSMENT
85yr old male with PMH of HTN. Dementia, Pacemaker for arrythmia, ?COPD was brought by wife to ER due to altered consciousness. At the time of my interview, no family available to provide details,  information obtained from ER physician and EMR. Pt obtunded, unable to provide any history.   In the ER , he was found to be hypothermic to 93F and Tachypneic, hypoxic on RA improved later. CXR suspicious for rt and LLL infiltrates, UA - dirty. He is being admitted for Sepsis with AMS possibly 2/2 pneumonia/ UTI. Hammonds was inserted in ER. CT head neg for acute infarct. More awake , e/o Gram neg UTI on culture.   showing e/o NSVT and frequent PVCs - Cardiology consulted - ECHO - New EF drop  as compared to Dec 2016 EF <20%

## 2017-07-25 NOTE — PROGRESS NOTE ADULT - SUBJECTIVE AND OBJECTIVE BOX
TONIA ABDUL    [id number]    85y      Male    CC: AMS lethargy, UTI/sepsis, NSVT  Awake today, feels better, in chair      INTERVAL HPI/OVERNIGHT EVENTS: blood tinged urine , position dependent      REVIEW OF SYSTEMS:    CONSTITUTIONAL: fatigue  RESPIRATORY: No shortness of breath  CARDIOVASCULAR: No chest pain,        Vital Signs Last 24 Hrs  T(C): 36.3 (25 Jul 2017 08:18), Max: 36.3 (24 Jul 2017 16:08)  T(F): 97.3 (25 Jul 2017 08:18), Max: 97.4 (24 Jul 2017 16:08)  HR: 60 (25 Jul 2017 12:39) (59 - 65)  BP: 118/80 (25 Jul 2017 12:39) (91/66 - 124/74)  BP(mean): 86 (25 Jul 2017 08:18) (75 - 88)  RR: 14 (25 Jul 2017 12:39) (11 - 15)  SpO2: 94% (25 Jul 2017 12:39) (94% - 98%)    PHYSICAL EXAM:      GENERAL: NAD, in chair,  awake and alert today, appropriately answering  HEENT: PERRL, +EOMI  NECK: soft, Supple  CHEST/LUNG: Clear to percussion bilaterally; No wheezing  HEART: S1S2+, Regular rate and rhythm; No murmurs, rubs, or gallops  ABDOMEN: Soft, Nontender, Nondistended  EXTREMITIES:  No clubbing, cyanosis, or edema  NEURO: AAOX3, no focal deficits  Hammonds - clear urine in tube, blood tinged in bag      07-24 @ 07:01 - 07-25 @ 07:00  --------------------------------------------------------  IN: 3620 mL / OUT: 1955 mL / NET: 1665 mL    07-25 @ 07:01 - 07-25 @ 14:44  --------------------------------------------------------  IN: 700 mL / OUT: 700 mL / NET: 0 mL        LABS:                        12.3   4.9   )-----------( 90       ( 25 Jul 2017 08:06 )             36.7     07-24    145  |  114<H>  |  34.0<H>  ----------------------------<  70  4.8   |  17.0<L>  |  1.26    Ca    8.6      24 Jul 2017 07:16  Phos  2.0     07-24  Mg     1.4     07-24              MEDICATIONS  (STANDING):  magnesium oxide 400 milliGRAM(s) Oral three times a day with meals  nystatin Powder 1 Application(s) Topical two times a day  ampicillin  IVPB 1 Gram(s) IV Intermittent every 6 hours  tamsulosin 0.4 milliGRAM(s) Oral at bedtime    MEDICATIONS  (PRN):  ALBUTerol/ipratropium for Nebulization 3 milliLiter(s) Nebulizer every 6 hours PRN Shortness of Breath and/or Wheezing  acetaminophen   Tablet. 650 milliGRAM(s) Oral every 6 hours PRN Mild Pain (1 - 3)      RADIOLOGY & ADDITIONAL TESTS:

## 2017-07-25 NOTE — PROGRESS NOTE ADULT - PROBLEM SELECTOR PLAN 7
AV dual paced on EKG - tracing reviewed  NSVT, PVCs - Appreciate EP eval, pacemaker checked  ECHO - reviewed   CArdio aware

## 2017-07-26 LAB
ANION GAP SERPL CALC-SCNC: 16 MMOL/L — SIGNIFICANT CHANGE UP (ref 5–17)
BUN SERPL-MCNC: 25 MG/DL — HIGH (ref 8–20)
CALCIUM SERPL-MCNC: 9 MG/DL — SIGNIFICANT CHANGE UP (ref 8.6–10.2)
CHLORIDE SERPL-SCNC: 110 MMOL/L — HIGH (ref 98–107)
CO2 SERPL-SCNC: 19 MMOL/L — LOW (ref 22–29)
CREAT SERPL-MCNC: 0.97 MG/DL — SIGNIFICANT CHANGE UP (ref 0.5–1.3)
EOSINOPHIL # BLD AUTO: 0.1 K/UL — SIGNIFICANT CHANGE UP (ref 0–0.5)
EOSINOPHIL NFR BLD AUTO: 1.7 % — SIGNIFICANT CHANGE UP (ref 0–5)
GLUCOSE SERPL-MCNC: 84 MG/DL — SIGNIFICANT CHANGE UP (ref 70–115)
HCT VFR BLD CALC: 40.3 % — LOW (ref 42–52)
HGB BLD-MCNC: 13.8 G/DL — LOW (ref 14–18)
LYMPHOCYTES # BLD AUTO: 0.8 K/UL — LOW (ref 1–4.8)
LYMPHOCYTES # BLD AUTO: 13.1 % — LOW (ref 20–55)
MAGNESIUM SERPL-MCNC: 1.9 MG/DL — SIGNIFICANT CHANGE UP (ref 1.6–2.6)
MCHC RBC-ENTMCNC: 31.6 PG — HIGH (ref 27–31)
MCHC RBC-ENTMCNC: 34.2 G/DL — SIGNIFICANT CHANGE UP (ref 32–36)
MCV RBC AUTO: 92.2 FL — SIGNIFICANT CHANGE UP (ref 80–94)
MONOCYTES # BLD AUTO: 0.6 K/UL — SIGNIFICANT CHANGE UP (ref 0–0.8)
MONOCYTES NFR BLD AUTO: 9.2 % — SIGNIFICANT CHANGE UP (ref 3–10)
NEUTROPHILS # BLD AUTO: 4.5 K/UL — SIGNIFICANT CHANGE UP (ref 1.8–8)
NEUTROPHILS NFR BLD AUTO: 75.3 % — HIGH (ref 37–73)
PLAT MORPH BLD: ABNORMAL
PLATELET # BLD AUTO: 86 K/UL — LOW (ref 150–400)
POTASSIUM SERPL-MCNC: 4.4 MMOL/L — SIGNIFICANT CHANGE UP (ref 3.5–5.3)
POTASSIUM SERPL-SCNC: 4.4 MMOL/L — SIGNIFICANT CHANGE UP (ref 3.5–5.3)
RBC # BLD: 4.37 M/UL — LOW (ref 4.6–6.2)
RBC # FLD: 14.3 % — SIGNIFICANT CHANGE UP (ref 11–15.6)
RBC BLD AUTO: NORMAL — SIGNIFICANT CHANGE UP
SODIUM SERPL-SCNC: 145 MMOL/L — SIGNIFICANT CHANGE UP (ref 135–145)
WBC # BLD: 6 K/UL — SIGNIFICANT CHANGE UP (ref 4.8–10.8)
WBC # FLD AUTO: 6 K/UL — SIGNIFICANT CHANGE UP (ref 4.8–10.8)

## 2017-07-26 PROCEDURE — 99233 SBSQ HOSP IP/OBS HIGH 50: CPT

## 2017-07-26 RX ORDER — CEFTRIAXONE 500 MG/1
INJECTION, POWDER, FOR SOLUTION INTRAMUSCULAR; INTRAVENOUS
Qty: 0 | Refills: 0 | Status: DISCONTINUED | OUTPATIENT
Start: 2017-07-26 | End: 2017-07-27

## 2017-07-26 RX ORDER — CEFTRIAXONE 500 MG/1
1 INJECTION, POWDER, FOR SOLUTION INTRAMUSCULAR; INTRAVENOUS EVERY 24 HOURS
Qty: 0 | Refills: 0 | Status: DISCONTINUED | OUTPATIENT
Start: 2017-07-27 | End: 2017-07-27

## 2017-07-26 RX ORDER — QUETIAPINE FUMARATE 200 MG/1
12.5 TABLET, FILM COATED ORAL AT BEDTIME
Qty: 0 | Refills: 0 | Status: DISCONTINUED | OUTPATIENT
Start: 2017-07-26 | End: 2017-07-27

## 2017-07-26 RX ORDER — CEFTRIAXONE 500 MG/1
1 INJECTION, POWDER, FOR SOLUTION INTRAMUSCULAR; INTRAVENOUS ONCE
Qty: 0 | Refills: 0 | Status: COMPLETED | OUTPATIENT
Start: 2017-07-26 | End: 2017-07-26

## 2017-07-26 RX ORDER — ENOXAPARIN SODIUM 100 MG/ML
40 INJECTION SUBCUTANEOUS DAILY
Qty: 0 | Refills: 0 | Status: DISCONTINUED | OUTPATIENT
Start: 2017-07-26 | End: 2017-08-03

## 2017-07-26 RX ADMIN — CEFTRIAXONE 100 GRAM(S): 500 INJECTION, POWDER, FOR SOLUTION INTRAMUSCULAR; INTRAVENOUS at 18:39

## 2017-07-26 RX ADMIN — ENOXAPARIN SODIUM 40 MILLIGRAM(S): 100 INJECTION SUBCUTANEOUS at 22:06

## 2017-07-26 RX ADMIN — Medication 108 GRAM(S): at 06:29

## 2017-07-26 RX ADMIN — TAMSULOSIN HYDROCHLORIDE 0.4 MILLIGRAM(S): 0.4 CAPSULE ORAL at 22:06

## 2017-07-26 RX ADMIN — MAGNESIUM OXIDE 400 MG ORAL TABLET 400 MILLIGRAM(S): 241.3 TABLET ORAL at 13:40

## 2017-07-26 RX ADMIN — MAGNESIUM OXIDE 400 MG ORAL TABLET 400 MILLIGRAM(S): 241.3 TABLET ORAL at 18:39

## 2017-07-26 RX ADMIN — Medication 3 MILLILITER(S): at 18:03

## 2017-07-26 RX ADMIN — QUETIAPINE FUMARATE 12.5 MILLIGRAM(S): 200 TABLET, FILM COATED ORAL at 22:06

## 2017-07-26 RX ADMIN — NYSTATIN CREAM 1 APPLICATION(S): 100000 CREAM TOPICAL at 06:29

## 2017-07-26 RX ADMIN — NYSTATIN CREAM 1 APPLICATION(S): 100000 CREAM TOPICAL at 22:06

## 2017-07-26 RX ADMIN — Medication 108 GRAM(S): at 13:40

## 2017-07-26 NOTE — PROGRESS NOTE ADULT - ASSESSMENT
85yr old male with PMH of HTN. Dementia, Pacemaker for arrythmia, ?COPD was brought by wife to ER due to altered consciousness. At the time of my interview, no family available to provide details,  information obtained from ER physician and EMR. Pt obtunded, unable to provide any history.   In the ER , he was found to be hypothermic to 93F and Tachypneic, hypoxic on RA improved later. CXR suspicious for rt and LLL infiltrates, UA - dirty. He is being admitted for Sepsis with AMS possibly 2/2 pneumonia/ UTI. Hammonds was inserted in ER. CT head neg for acute infarct. More awake , e/o Gram neg UTI on culture.   showing e/o NSVT and frequent PVCs - Cardiology consulted - ECHO - New EF drop  as compared to Dec 2016 EF <20%  await - treatment of sepsis - may need cath

## 2017-07-26 NOTE — PROGRESS NOTE ADULT - SUBJECTIVE AND OBJECTIVE BOX
TONIA ABDUL    [id number]    85y      Male    CC: AMS, UTI, NSVT, Acute systolic HF  more delirious today, could not sleep last night  voiding urine - incontinent    INTERVAL HPI/OVERNIGHT EVENTS: hypothermic again today    REVIEW OF SYSTEMS:    unable to obtain ROS - confused but denies any pain, denies feeling SOB      Vital Signs Last 24 Hrs  T(C): 34.6 (26 Jul 2017 16:37), Max: 36.9 (25 Jul 2017 23:38)  T(F): 94.3 (26 Jul 2017 16:37), Max: 98.4 (25 Jul 2017 23:38)  HR: 69 (26 Jul 2017 16:37) (58 - 69)  BP: 124/60 (26 Jul 2017 16:37) (110/64 - 139/83)  BP(mean): --  RR: 18 (26 Jul 2017 16:37) (12 - 18)  SpO2: 91% (26 Jul 2017 16:37) (91% - 99%)    PHYSICAL EXAM:    GENERAL: NAD, in chair,  awake and alert today, appropriately answering  HEENT: PERRL, +EOMI  NECK: soft, Supple  CHEST/LUNG: Clear to percussion bilaterally; No wheezing  HEART: S1S2+, Regular rate and rhythm; No murmurs, rubs, or gallops  ABDOMEN: Soft, Nontender, Nondistended  EXTREMITIES:  No clubbing, cyanosis, or edema  NEURO: JAIME,      07-25 @ 07:01  -  07-26 @ 07:00  --------------------------------------------------------  IN: 990 mL / OUT: 900 mL / NET: 90 mL        LABS:                        13.8   6.0   )-----------( 86       ( 26 Jul 2017 07:30 )             40.3     07-26    145  |  110<H>  |  25.0<H>  ----------------------------<  84  4.4   |  19.0<L>  |  0.97    Ca    9.0      26 Jul 2017 07:30  Mg     1.9     07-26              MEDICATIONS  (STANDING):  magnesium oxide 400 milliGRAM(s) Oral three times a day with meals  nystatin Powder 1 Application(s) Topical two times a day  tamsulosin 0.4 milliGRAM(s) Oral at bedtime  cefTRIAXone   IVPB      cefTRIAXone   IVPB 1 Gram(s) IV Intermittent once  enoxaparin Injectable 40 milliGRAM(s) SubCutaneous daily    MEDICATIONS  (PRN):  ALBUTerol/ipratropium for Nebulization 3 milliLiter(s) Nebulizer every 6 hours PRN Shortness of Breath and/or Wheezing  acetaminophen   Tablet. 650 milliGRAM(s) Oral every 6 hours PRN Mild Pain (1 - 3)  QUEtiapine 12.5 milliGRAM(s) Oral at bedtime PRN delirium, insomnia      RADIOLOGY & ADDITIONAL TESTS:

## 2017-07-26 NOTE — PROGRESS NOTE ADULT - PROBLEM SELECTOR PLAN 2
toxic metabolic encephalopathy - resolved  ct antibiotics for UTI - E fecalis  switch back to Ceftriaxone

## 2017-07-27 DIAGNOSIS — N39.0 URINARY TRACT INFECTION, SITE NOT SPECIFIED: ICD-10-CM

## 2017-07-27 DIAGNOSIS — E83.42 HYPOMAGNESEMIA: ICD-10-CM

## 2017-07-27 DIAGNOSIS — J15.9 UNSPECIFIED BACTERIAL PNEUMONIA: ICD-10-CM

## 2017-07-27 LAB
ANION GAP SERPL CALC-SCNC: 14 MMOL/L — SIGNIFICANT CHANGE UP (ref 5–17)
BUN SERPL-MCNC: 21 MG/DL — HIGH (ref 8–20)
CALCIUM SERPL-MCNC: 8.8 MG/DL — SIGNIFICANT CHANGE UP (ref 8.6–10.2)
CHLORIDE SERPL-SCNC: 112 MMOL/L — HIGH (ref 98–107)
CO2 SERPL-SCNC: 20 MMOL/L — LOW (ref 22–29)
CREAT SERPL-MCNC: 1.17 MG/DL — SIGNIFICANT CHANGE UP (ref 0.5–1.3)
CULTURE RESULTS: SIGNIFICANT CHANGE UP
CULTURE RESULTS: SIGNIFICANT CHANGE UP
GLUCOSE SERPL-MCNC: 64 MG/DL — LOW (ref 70–115)
HCT VFR BLD CALC: 37 % — LOW (ref 42–52)
HGB BLD-MCNC: 12.5 G/DL — LOW (ref 14–18)
MAGNESIUM SERPL-MCNC: 1.7 MG/DL — SIGNIFICANT CHANGE UP (ref 1.6–2.6)
MCHC RBC-ENTMCNC: 31.3 PG — HIGH (ref 27–31)
MCHC RBC-ENTMCNC: 33.8 G/DL — SIGNIFICANT CHANGE UP (ref 32–36)
MCV RBC AUTO: 92.5 FL — SIGNIFICANT CHANGE UP (ref 80–94)
PLATELET # BLD AUTO: 95 K/UL — LOW (ref 150–400)
POTASSIUM SERPL-MCNC: 4.3 MMOL/L — SIGNIFICANT CHANGE UP (ref 3.5–5.3)
POTASSIUM SERPL-SCNC: 4.3 MMOL/L — SIGNIFICANT CHANGE UP (ref 3.5–5.3)
RBC # BLD: 4 M/UL — LOW (ref 4.6–6.2)
RBC # FLD: 14.1 % — SIGNIFICANT CHANGE UP (ref 11–15.6)
SODIUM SERPL-SCNC: 146 MMOL/L — HIGH (ref 135–145)
SPECIMEN SOURCE: SIGNIFICANT CHANGE UP
SPECIMEN SOURCE: SIGNIFICANT CHANGE UP
WBC # BLD: 6.7 K/UL — SIGNIFICANT CHANGE UP (ref 4.8–10.8)
WBC # FLD AUTO: 6.7 K/UL — SIGNIFICANT CHANGE UP (ref 4.8–10.8)

## 2017-07-27 PROCEDURE — 99233 SBSQ HOSP IP/OBS HIGH 50: CPT

## 2017-07-27 PROCEDURE — 99223 1ST HOSP IP/OBS HIGH 75: CPT

## 2017-07-27 RX ORDER — PIPERACILLIN AND TAZOBACTAM 4; .5 G/20ML; G/20ML
3.38 INJECTION, POWDER, LYOPHILIZED, FOR SOLUTION INTRAVENOUS EVERY 8 HOURS
Qty: 0 | Refills: 0 | Status: COMPLETED | OUTPATIENT
Start: 2017-07-27 | End: 2017-08-02

## 2017-07-27 RX ORDER — MAGNESIUM SULFATE 500 MG/ML
2 VIAL (ML) INJECTION ONCE
Qty: 0 | Refills: 0 | Status: COMPLETED | OUTPATIENT
Start: 2017-07-27 | End: 2017-07-27

## 2017-07-27 RX ORDER — PIPERACILLIN AND TAZOBACTAM 4; .5 G/20ML; G/20ML
3.38 INJECTION, POWDER, LYOPHILIZED, FOR SOLUTION INTRAVENOUS ONCE
Qty: 0 | Refills: 0 | Status: COMPLETED | OUTPATIENT
Start: 2017-07-27 | End: 2017-07-27

## 2017-07-27 RX ORDER — SODIUM CHLORIDE 9 MG/ML
1000 INJECTION, SOLUTION INTRAVENOUS
Qty: 0 | Refills: 0 | Status: DISCONTINUED | OUTPATIENT
Start: 2017-07-27 | End: 2017-07-30

## 2017-07-27 RX ORDER — ACETAMINOPHEN 500 MG
1000 TABLET ORAL ONCE
Qty: 0 | Refills: 0 | Status: DISCONTINUED | OUTPATIENT
Start: 2017-07-27 | End: 2017-08-03

## 2017-07-27 RX ORDER — LISINOPRIL 2.5 MG/1
5 TABLET ORAL DAILY
Qty: 0 | Refills: 0 | Status: DISCONTINUED | OUTPATIENT
Start: 2017-07-27 | End: 2017-07-30

## 2017-07-27 RX ADMIN — NYSTATIN CREAM 1 APPLICATION(S): 100000 CREAM TOPICAL at 17:16

## 2017-07-27 RX ADMIN — SODIUM CHLORIDE 100 MILLILITER(S): 9 INJECTION, SOLUTION INTRAVENOUS at 17:15

## 2017-07-27 RX ADMIN — ENOXAPARIN SODIUM 40 MILLIGRAM(S): 100 INJECTION SUBCUTANEOUS at 13:27

## 2017-07-27 RX ADMIN — CEFTRIAXONE 100 GRAM(S): 500 INJECTION, POWDER, FOR SOLUTION INTRAMUSCULAR; INTRAVENOUS at 17:15

## 2017-07-27 RX ADMIN — PIPERACILLIN AND TAZOBACTAM 200 GRAM(S): 4; .5 INJECTION, POWDER, LYOPHILIZED, FOR SOLUTION INTRAVENOUS at 23:03

## 2017-07-27 RX ADMIN — Medication 50 GRAM(S): at 15:06

## 2017-07-27 RX ADMIN — PIPERACILLIN AND TAZOBACTAM 25 GRAM(S): 4; .5 INJECTION, POWDER, LYOPHILIZED, FOR SOLUTION INTRAVENOUS at 23:13

## 2017-07-27 RX ADMIN — SODIUM CHLORIDE 100 MILLILITER(S): 9 INJECTION, SOLUTION INTRAVENOUS at 23:02

## 2017-07-27 NOTE — PROGRESS NOTE ADULT - ASSESSMENT
85yr old male with PMH of HTN. 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 repeat 2-D echocardiogram consistent with marked LV systolic dysfunction LVEF 20%, tele AsVP with episodes of NSVT    Recc:    ID work/up on going , continue broad spectrum , surveillance cultures, wbc stable.  Keep  K>4 mg >2   Repeat cxray in am ; lasix low dose prn  Ischemic eval and possible PPM upgrade to ICD as an outpatient once ID issues resolved.

## 2017-07-27 NOTE — PROGRESS NOTE ADULT - ASSESSMENT
85yr old male with PMH of HTN. Dementia, Pacemaker for arrythmia, ?COPD was brought by wife to ER due to altered consciousness. At the time of my interview, no family available to provide details,  information obtained from ER physician and EMR. Pt obtunded, unable to provide any history.   In the ER , he was found to be hypothermic to 93F and Tachypneic, hypoxic on RA improved later. CXR suspicious for rt and LLL infiltrates, UA - dirty. He is being admitted for Sepsis with AMS possibly 2/2 pneumonia/ UTI. Hammonds was inserted in ER. CT head neg for acute infarct. More awake , e/o Gram neg UTI on culture.   showing e/o NSVT and frequent PVCs - Cardiology consulted - ECHO - New EF drop  as compared to Dec 2016 EF <20%  await - treatment of sepsis . ID consulted. for recurrence of delirium, hypothermia on ampicillin, switched back to rocephin, PEr cardio - No further work up for now until UTI resolves. Can be done on outpt.

## 2017-07-27 NOTE — PROGRESS NOTE ADULT - SUBJECTIVE AND OBJECTIVE BOX
TONIA ABDUL    [id number]    85y      Male    CC: AMS, Acute CHF, NSVT, UTI   delirious again today, hypotehrmic yesterday     INTERVAL HPI/OVERNIGHT EVENTS: no acute events    REVIEW OF SYSTEMS:    unable to obtain from pt as delirious      Vital Signs Last 24 Hrs  T(C): 36.9 (27 Jul 2017 07:45), Max: 38.4 (27 Jul 2017 05:00)  T(F): 98.4 (27 Jul 2017 07:45), Max: 101.2 (27 Jul 2017 05:00)  HR: 85 (27 Jul 2017 07:45) (64 - 85)  BP: 100/55 (27 Jul 2017 07:45) (100/55 - 105/55)  BP(mean): --  RR: 20 (27 Jul 2017 07:45) (18 - 20)  SpO2: 94% (27 Jul 2017 05:00) (94% - 98%)    PHYSICAL EXAM:    GENERAL: confused, follows simple commands, myoclonic jerks noted.   HEENT: barely opening eyes to vocal commands  NECK: soft, Supple  CHEST/LUNG: Clear to percussion bilaterally; No wheezing  HEART: S1S2+, Regular rate and rhythm; No murmurs, rubs, or gallops  ABDOMEN: Soft, Nontender, Nondistended; Bowel sounds present  EXTREMITIES:  No clubbing, cyanosis, or edema  NEURO: AAOX1 myoclonic jerks+        LABS:                        12.5   6.7   )-----------( 95       ( 27 Jul 2017 07:03 )             37.0     07-27    146<H>  |  112<H>  |  21.0<H>  ----------------------------<  64<L>  4.3   |  20.0<L>  |  1.17    Ca    8.8      27 Jul 2017 07:03  Mg     1.7     07-27              MEDICATIONS  (STANDING):  magnesium oxide 400 milliGRAM(s) Oral three times a day with meals  nystatin Powder 1 Application(s) Topical two times a day  tamsulosin 0.4 milliGRAM(s) Oral at bedtime  cefTRIAXone   IVPB      cefTRIAXone   IVPB 1 Gram(s) IV Intermittent every 24 hours  enoxaparin Injectable 40 milliGRAM(s) SubCutaneous daily  acetaminophen  IVPB 1000 milliGRAM(s) IV Intermittent once  lisinopril 5 milliGRAM(s) Oral daily  dextrose 5% + sodium chloride 0.45%. 1000 milliLiter(s) (100 mL/Hr) IV Continuous <Continuous>    MEDICATIONS  (PRN):  ALBUTerol/ipratropium for Nebulization 3 milliLiter(s) Nebulizer every 6 hours PRN Shortness of Breath and/or Wheezing  acetaminophen   Tablet. 650 milliGRAM(s) Oral every 6 hours PRN Mild Pain (1 - 3)      RADIOLOGY & ADDITIONAL TESTS:

## 2017-07-27 NOTE — CONSULT NOTE ADULT - SUBJECTIVE AND OBJECTIVE BOX
[facility]  INFECTIOUS DISEASES AND INTERNAL MEDICINE OF [place]  =======================================================  Song Villegas MD  Diplomates American Board of Internal Medicine and Infectious Diseases  =======================================================    History obtain from the Son at bedside and the chart, patient not communicative       MRN-[id number]  TONIA ABDUL    CC: Patient is a 85y old  Male who presents with a chief complaint of feeling generally ill (22 Jul 2017 23:06)    86y/o  Male  with h/o Dementia initially presented to the ER from home with decreased mental status, and poor appetite for 3 days. In the ER patient was noted to be hypothermic to 92F. Patient also had ELENI. CXR with possible PNA and UA with pyuria. Patient was admitted and started on Ceftriaxone and Azithromycin. Patient initially responded to treatment. This morning patient spiked fever to 101.2F and had worsening mental status. No leukocytosis this admission. Urine culture growing E faecalis and blood cultures with no growth. ID input requested.       Past Medical & Surgical Hx:  Pacemaker  Dementia  CAD (coronary artery disease)  HLD (hyperlipidemia)  HTN (hypertension)  COPD (chronic obstructive pulmonary disease)  History of transurethral prostatectomy: transurethral needle ablation /TUNA of prostate 4 years ago  Hernia, epigastric      Social Hx:  Former smoker      FAMILY HISTORY:  No pertinent family history in first degree relatives      Allergies  Sulfur Precipitated (Hives)      Antibiotics:  piperacillin/tazobactam IVPB. 3.375 Gram(s) IV Intermittent every 8 hours       REVIEW OF SYSTEMS:  Unable to obtain, patient only opens eyes to physical stimuli      Physical Exam:  Vital Signs Last 24 Hrs  T(C): 36.9 (27 Jul 2017 07:45), Max: 38.4 (27 Jul 2017 05:00)  T(F): 98.4 (27 Jul 2017 07:45), Max: 101.2 (27 Jul 2017 05:00)  HR: 85 (27 Jul 2017 07:45) (64 - 85)  BP: 100/55 (27 Jul 2017 07:45) (100/55 - 105/55)  RR: 20 (27 Jul 2017 07:45) (18 - 20)  SpO2: 94% (27 Jul 2017 05:00) (94% - 98%)      GEN: opens eyes to physical stimuli.  HEENT: normocephalic and atraumatic.   NECK: Supple.   LUNGS: Clear to auscultation.  HEART: Regular rate and rhythm.  ABDOMEN: Soft, nontender, and nondistended.  Positive bowel sounds.    : No CVA tenderness  EXTREMITIES: trace edema.  MSK: No joint swelling  NEUROLOGIC: opens eyes to physical stimuli.  PSYCHIATRIC: unable to assess  SKIN: multiple bruises on the skin      Labs:  07-27    146<H>  |  112<H>  |  21.0<H>  ----------------------------<  64<L>  4.3   |  20.0<L>  |  1.17    Ca    8.8      27 Jul 2017 07:03  Mg     1.7     07-27                            12.5   6.7   )-----------( 95       ( 27 Jul 2017 07:03 )             37.0       RECENT CULTURES:  Culture - Urine (07.22.17 @ 15:45)    -  Ampicillin: S <=2    -  Nitrofurantoin: S <=32    -  Tetra/Doxy: R >8    -  Vancomycin: S 1    Specimen Source: .Urine Catheterized    Culture Results:   >100,000 CFU/ml Enterococcus faecalis    Organism Identification: Enterococcus faecalis    Organism: Enterococcus faecalis    Method Type: ROMAN      Culture - Blood (07.22.17 @ 13:20)    Specimen Source: .Blood Blood-Peripheral    Culture Results:   No growth at 5 days.      Culture - Blood (07.22.17 @ 13:20)    Specimen Source: .Blood Blood-Peripheral    Culture Results:   No growth at 5 days.        EXAM:  CHEST SINGLE VIEW FRONTAL                        PROCEDURE DATE:  07/22/2017    INTERPRETATION:  CLINICAL STATEMENT: Chest pain.  TECHNIQUE: AP view of the chest.  COMPARISON: 12/9/2016  FINDINGS/  IMPRESSION:  Left cardiac device again noted.  New increased interstitial lung markings with mild airspace opacities   lung bases. Probable small bilateral pleural effusions.  Nonspecific new prominence of bilateral hilum which could be related to   prominent pulmonary vessels. Follow-up recommended.  Heart size within normal limits

## 2017-07-27 NOTE — PROGRESS NOTE ADULT - PROBLEM SELECTOR PLAN 2
toxic metabolic encephalopathy - resolved  ct antibiotics for UTI - E fecalis --> switch back to rocephin   US renal to r/o hydronephrosis/pyelonephritis  ID consult

## 2017-07-27 NOTE — PROGRESS NOTE ADULT - SUBJECTIVE AND OBJECTIVE BOX
Events noted  stable on telemetry,  hypothermia       TELE:AsVp    < from: TTE Echo Complete w/Doppler (07.24.17 @ 21:29) >   Summary:   1. Technically adequate study.   2. Severely decreased global left ventricular systolic function.   3. Left ventricular ejection fraction, by visual estimation, is<20%.   Basal segments are hypokinetic, mid to distal segments are almost   akineitc.   4. Spectral Doppler shows pseudonormal pattern of left ventricular   myocardial filling (Grade II diastolic dysfunction).   5. Severely enlarged left atrium.   6.Moderately dilated right atrium.   7. Elevated left atrial and left ventricular end-diastolic pressures.   Mean estimated LAP of 30 mmHg.   8. The mitral valve leaflets are tethered which is due to reduced   systolic function and elevated LVEDP.   9.Moderate mitral valve regurgitation.  10. Sclerotic aortic valve with normal opening.  11. Mild aortic regurgitation.  12. Moderate tricuspid regurgitation.  13. Estimated pulmonary artery systolic pressure is 49.9 mmHg assuming a   right atrial pressure of 5 mmHg, which is consistent with mild pulmonary   hypertension.  14. There is no evidence of pericardial effusion.  15. There is significant decrease in LV function compared to a study from   12/2016. Findings were discussed with Dr. Zev kilgore telephone.     MD Rubin Electronically signed on 7/25/2017 at 2:00:53 PM    < end of copied text >      MEDICATIONS  (STANDING):  magnesium oxide 400 milliGRAM(s) Oral three times a day with meals  nystatin Powder 1 Application(s) Topical two times a day  tamsulosin 0.4 milliGRAM(s) Oral at bedtime  cefTRIAXone   IVPB      cefTRIAXone   IVPB 1 Gram(s) IV Intermittent every 24 hours  enoxaparin Injectable 40 milliGRAM(s) SubCutaneous daily  acetaminophen  IVPB 1000 milliGRAM(s) IV Intermittent once  lisinopril 5 milliGRAM(s) Oral daily    MEDICATIONS  (PRN):  ALBUTerol/ipratropium for Nebulization 3 milliLiter(s) Nebulizer every 6 hours PRN Shortness of Breath and/or Wheezing  acetaminophen   Tablet. 650 milliGRAM(s) Oral every 6 hours PRN Mild Pain (1 - 3)  QUEtiapine 12.5 milliGRAM(s) Oral at bedtime PRN delirium, insomnia      Allergies    Sulfur Precipitated (Hives)    Intolerances      PAST MEDICAL & SURGICAL HISTORY:  Pacemaker  Dementia  CAD (coronary artery disease)  HLD (hyperlipidemia)  HTN (hypertension)  COPD (chronic obstructive pulmonary disease)  History of transurethral prostatectomy: transurethral needle ablation /TUNA of prostate 4 years ago  Hernia, epigastric      Vital Signs Last 24 Hrs  T(C): 36.9 (27 Jul 2017 07:45), Max: 38.4 (27 Jul 2017 05:00)  T(F): 98.4 (27 Jul 2017 07:45), Max: 101.2 (27 Jul 2017 05:00)  HR: 85 (27 Jul 2017 07:45) (64 - 85)  BP: 100/55 (27 Jul 2017 07:45) (100/55 - 124/60)  BP(mean): --  RR: 20 (27 Jul 2017 07:45) (18 - 20)  SpO2: 94% (27 Jul 2017 05:00) (91% - 98%)    Physical Exam:  Constitutional: NAD, AAOx3  Cardiovascular: +S1S2 RRR  Pulmonary: CTA b/l, unlabored  Abd: soft NTND +BS  Groins: C/D/I bilaterally; no bleeding, hematoma, edema  Extremities: no pedal edema, +distal pulses b/l  Neuro: non focal, BURT x4    LABS:                        12.5   6.7   )-----------( 95       ( 27 Jul 2017 07:03 )             37.0     07-27    146<H>  |  112<H>  |  21.0<H>  ----------------------------<  64<L>  4.3   |  20.0<L>  |  1.17    Ca    8.8      27 Jul 2017 07:03  Mg     1.7     07-27

## 2017-07-27 NOTE — PROGRESS NOTE ADULT - PROBLEM SELECTOR PLAN 7
AV dual paced on EKG - tracing reviewed  NSVT, PVCs - Appreciate EP eval, pacemaker checked  ECHO - reviewed   CArdio recs appreciated

## 2017-07-28 LAB
ANION GAP SERPL CALC-SCNC: 11 MMOL/L — SIGNIFICANT CHANGE UP (ref 5–17)
BASOPHILS # BLD AUTO: 0 K/UL — SIGNIFICANT CHANGE UP (ref 0–0.2)
BASOPHILS NFR BLD AUTO: 0.2 % — SIGNIFICANT CHANGE UP (ref 0–2)
BUN SERPL-MCNC: 17 MG/DL — SIGNIFICANT CHANGE UP (ref 8–20)
CALCIUM SERPL-MCNC: 8.6 MG/DL — SIGNIFICANT CHANGE UP (ref 8.6–10.2)
CHLORIDE SERPL-SCNC: 109 MMOL/L — HIGH (ref 98–107)
CO2 SERPL-SCNC: 23 MMOL/L — SIGNIFICANT CHANGE UP (ref 22–29)
CREAT SERPL-MCNC: 1.02 MG/DL — SIGNIFICANT CHANGE UP (ref 0.5–1.3)
EOSINOPHIL # BLD AUTO: 0.3 K/UL — SIGNIFICANT CHANGE UP (ref 0–0.5)
EOSINOPHIL NFR BLD AUTO: 5 % — SIGNIFICANT CHANGE UP (ref 0–5)
GLUCOSE SERPL-MCNC: 114 MG/DL — SIGNIFICANT CHANGE UP (ref 70–115)
HCT VFR BLD CALC: 37.7 % — LOW (ref 42–52)
HGB BLD-MCNC: 12.7 G/DL — LOW (ref 14–18)
LYMPHOCYTES # BLD AUTO: 0.8 K/UL — LOW (ref 1–4.8)
LYMPHOCYTES # BLD AUTO: 13.2 % — LOW (ref 20–55)
MAGNESIUM SERPL-MCNC: 1.9 MG/DL — SIGNIFICANT CHANGE UP (ref 1.6–2.6)
MCHC RBC-ENTMCNC: 31.4 PG — HIGH (ref 27–31)
MCHC RBC-ENTMCNC: 33.7 G/DL — SIGNIFICANT CHANGE UP (ref 32–36)
MCV RBC AUTO: 93.1 FL — SIGNIFICANT CHANGE UP (ref 80–94)
MONOCYTES # BLD AUTO: 0.6 K/UL — SIGNIFICANT CHANGE UP (ref 0–0.8)
MONOCYTES NFR BLD AUTO: 9.6 % — SIGNIFICANT CHANGE UP (ref 3–10)
NEUTROPHILS # BLD AUTO: 4.4 K/UL — SIGNIFICANT CHANGE UP (ref 1.8–8)
NEUTROPHILS NFR BLD AUTO: 71.5 % — SIGNIFICANT CHANGE UP (ref 37–73)
PLATELET # BLD AUTO: 97 K/UL — LOW (ref 150–400)
POTASSIUM SERPL-MCNC: 3.9 MMOL/L — SIGNIFICANT CHANGE UP (ref 3.5–5.3)
POTASSIUM SERPL-SCNC: 3.9 MMOL/L — SIGNIFICANT CHANGE UP (ref 3.5–5.3)
RBC # BLD: 4.05 M/UL — LOW (ref 4.6–6.2)
RBC # FLD: 14.2 % — SIGNIFICANT CHANGE UP (ref 11–15.6)
SODIUM SERPL-SCNC: 143 MMOL/L — SIGNIFICANT CHANGE UP (ref 135–145)
WBC # BLD: 6.2 K/UL — SIGNIFICANT CHANGE UP (ref 4.8–10.8)
WBC # FLD AUTO: 6.2 K/UL — SIGNIFICANT CHANGE UP (ref 4.8–10.8)

## 2017-07-28 PROCEDURE — 76775 US EXAM ABDO BACK WALL LIM: CPT | Mod: 26

## 2017-07-28 PROCEDURE — 99232 SBSQ HOSP IP/OBS MODERATE 35: CPT

## 2017-07-28 PROCEDURE — 99233 SBSQ HOSP IP/OBS HIGH 50: CPT

## 2017-07-28 RX ORDER — TAMSULOSIN HYDROCHLORIDE 0.4 MG/1
0.4 CAPSULE ORAL AT BEDTIME
Qty: 0 | Refills: 0 | Status: DISCONTINUED | OUTPATIENT
Start: 2017-07-28 | End: 2017-07-29

## 2017-07-28 RX ADMIN — PIPERACILLIN AND TAZOBACTAM 25 GRAM(S): 4; .5 INJECTION, POWDER, LYOPHILIZED, FOR SOLUTION INTRAVENOUS at 21:19

## 2017-07-28 RX ADMIN — NYSTATIN CREAM 1 APPLICATION(S): 100000 CREAM TOPICAL at 05:33

## 2017-07-28 RX ADMIN — NYSTATIN CREAM 1 APPLICATION(S): 100000 CREAM TOPICAL at 18:51

## 2017-07-28 RX ADMIN — ENOXAPARIN SODIUM 40 MILLIGRAM(S): 100 INJECTION SUBCUTANEOUS at 09:39

## 2017-07-28 RX ADMIN — PIPERACILLIN AND TAZOBACTAM 25 GRAM(S): 4; .5 INJECTION, POWDER, LYOPHILIZED, FOR SOLUTION INTRAVENOUS at 13:28

## 2017-07-28 RX ADMIN — PIPERACILLIN AND TAZOBACTAM 25 GRAM(S): 4; .5 INJECTION, POWDER, LYOPHILIZED, FOR SOLUTION INTRAVENOUS at 05:33

## 2017-07-28 RX ADMIN — SODIUM CHLORIDE 100 MILLILITER(S): 9 INJECTION, SOLUTION INTRAVENOUS at 09:39

## 2017-07-28 RX ADMIN — TAMSULOSIN HYDROCHLORIDE 0.4 MILLIGRAM(S): 0.4 CAPSULE ORAL at 21:20

## 2017-07-28 NOTE — PROVIDER CONTACT NOTE (OTHER) - SITUATION
Pt's rectal temp 98.4, unable to give medications including lisinopril due to obtunded but /63.
Pt went for renal US. Per sono tech, retaining 550mL.

## 2017-07-28 NOTE — PROGRESS NOTE ADULT - ASSESSMENT
85yr old male with PMH of HTN. Dementia, Pacemaker for arrythmia, ?COPD was brought by wife to ER due to altered consciousness. At the time of my interview, no family available to provide details,  information obtained from ER physician and EMR. Pt obtunded, unable to provide any history.   In the ER , he was found to be hypothermic to 93F and Tachypneic, hypoxic on RA improved later. CXR suspicious for rt and LLL infiltrates, UA - dirty. He is being admitted for Sepsis with AMS possibly 2/2 pneumonia/ UTI. Hammonds was inserted in ER. CT head neg for acute infarct. More awake , e/o Gram neg UTI on culture.   showing e/o NSVT and frequent PVCs - Cardiology consulted - ECHO - New EF drop  as compared to Dec 2016 EF <20%  await - treatment of sepsis . ID consulted. for recurrence of delirium, hypothermia on ampicillin, switched back to rocephin, PEr cardio - No further work up for now until UTI resolves. Can be done on outpt.   ID consulted - switched to zosyn on 7/27 - 7/28 - mentation improving, hypothermic overnight.

## 2017-07-28 NOTE — PROGRESS NOTE ADULT - PROBLEM SELECTOR PLAN 2
toxic metabolic encephalopathy - improving  ct antibiotics for UTI - E fecalis --> switched to zosyn per ID  f/u US renal to r/o hydronephrosis/pyelonephritis  ID recs appreciated

## 2017-07-28 NOTE — PROGRESS NOTE ADULT - PROBLEM SELECTOR PLAN 2
Patient received Ceftriaxone and Azithromycin  currently not hypoxic  Will hold off on doing CT Chest for now  CXR reviewed.

## 2017-07-28 NOTE — PROGRESS NOTE ADULT - SUBJECTIVE AND OBJECTIVE BOX
TONIA ABDUL    [id number]    85y      Male    CC: AMS, E fecalis UTI, ELENI, New EF drop , NSVT  mental state improving than yesterday - wife at bedside      INTERVAL HPI/OVERNIGHT EVENTS: No acute events    REVIEW OF SYSTEMS:    pt still confused - unable to obtain ROS      Vital Signs Last 24 Hrs  T(C): 37.2 (28 Jul 2017 13:53), Max: 37.2 (28 Jul 2017 07:15)  T(F): 99 (28 Jul 2017 13:53), Max: 99 (28 Jul 2017 07:15)  HR: 104 (28 Jul 2017 07:15) (60 - 104)  BP: 102/63 (28 Jul 2017 07:15) (102/63 - 124/76)  BP(mean): --  RR: 18 (28 Jul 2017 07:15) (16 - 18)  SpO2: 94% (28 Jul 2017 07:15) (94% - 96%)    PHYSICAL EXAM:    GENERAL: NAD, confused, awake, alert  HEENT: PERRL, +EOMI  CHEST/LUNG: Clear to percussion bilaterally; No wheezing  HEART: S1S2+, Regular rate and rhythm; No murmurs, rubs, or gallops  ABDOMEN: Soft, Nontender, Nondistended  EXTREMITIES: No clubbing, cyanosis, or edema  NEURO: AAOX1-2,         07-27 @ 07:01  -  07-28 @ 07:00  --------------------------------------------------------  IN: 1250 mL / OUT: 0 mL / NET: 1250 mL        LABS:                        12.7   6.2   )-----------( 97       ( 28 Jul 2017 07:38 )             37.7     07-28    143  |  109<H>  |  17.0  ----------------------------<  114  3.9   |  23.0  |  1.02    Ca    8.6      28 Jul 2017 07:38  Mg     1.9     07-28              MEDICATIONS  (STANDING):  magnesium oxide 400 milliGRAM(s) Oral three times a day with meals  nystatin Powder 1 Application(s) Topical two times a day  tamsulosin 0.4 milliGRAM(s) Oral at bedtime  enoxaparin Injectable 40 milliGRAM(s) SubCutaneous daily  acetaminophen  IVPB 1000 milliGRAM(s) IV Intermittent once  lisinopril 5 milliGRAM(s) Oral daily  dextrose 5% + sodium chloride 0.45%. 1000 milliLiter(s) (100 mL/Hr) IV Continuous <Continuous>  piperacillin/tazobactam IVPB. 3.375 Gram(s) IV Intermittent every 8 hours    MEDICATIONS  (PRN):  ALBUTerol/ipratropium for Nebulization 3 milliLiter(s) Nebulizer every 6 hours PRN Shortness of Breath and/or Wheezing  acetaminophen   Tablet. 650 milliGRAM(s) Oral every 6 hours PRN Mild Pain (1 - 3)      RADIOLOGY & ADDITIONAL TESTS:

## 2017-07-28 NOTE — PROGRESS NOTE ADULT - SUBJECTIVE AND OBJECTIVE BOX
[facility]  INFECTIOUS DISEASES AND INTERNAL MEDICINE [facility]  =======================================================  Song Villegas MD  Diplomates American Board of Internal Medicine and Infectious Diseases  =======================================================      TONIA ABDUL [id number]      Follow up: Fever / UTI    Afebrile      Allergies:  Sulfur Precipitated (Hives)      Antibiotics:  piperacillin/tazobactam IVPB. 3.375 Gram(s) IV Intermittent every 8 hours      REVIEW OF SYSTEMS:  Unable to obtain, patient only opens eyes to physical stimuli      Physical Exam:  Vital Signs Last 24 Hrs  T(C): 36.9 (28 Jul 2017 09:53), Max: 37.2 (28 Jul 2017 07:15)  T(F): 98.4 (28 Jul 2017 09:53), Max: 99 (28 Jul 2017 07:15)  HR: 104 (28 Jul 2017 07:15) (60 - 104)  BP: 102/63 (28 Jul 2017 07:15) (102/63 - 124/76)  RR: 18 (28 Jul 2017 07:15) (16 - 18)  SpO2: 94% (28 Jul 2017 07:15) (94% - 96%)      GEN: opens eyes to physical stimuli.  HEENT: normocephalic and atraumatic.   NECK: Supple.   LUNGS: Clear to auscultation.  HEART: Regular rate and rhythm.  ABDOMEN: Soft, nontender, and nondistended.  Positive bowel sounds.    : No CVA tenderness  EXTREMITIES: trace edema.  MSK: No joint swelling  NEUROLOGIC: opens eyes to physical stimuli.  PSYCHIATRIC: unable to assess  SKIN: multiple bruises on the skin      Labs:  07-28    143  |  109<H>  |  17.0  ----------------------------<  114  3.9   |  23.0  |  1.02    Ca    8.6      28 Jul 2017 07:38  Mg     1.9     07-28                            12.7   6.2   )-----------( 97       ( 28 Jul 2017 07:38 )             37.7       RECENT CULTURES:  Culture - Urine (07.22.17 @ 15:45)    -  Ampicillin: S <=2    -  Nitrofurantoin: S <=32    -  Tetra/Doxy: R >8    -  Vancomycin: S 1    Specimen Source: .Urine Catheterized    Culture Results:   >100,000 CFU/ml Enterococcus faecalis    Organism Identification: Enterococcus faecalis    Organism: Enterococcus faecalis    Method Type: ROMAN      Culture - Blood (07.22.17 @ 13:20)    Specimen Source: .Blood Blood-Peripheral    Culture Results:   No growth at 5 days.      Culture - Blood (07.22.17 @ 13:20)    Specimen Source: .Blood Blood-Peripheral    Culture Results:   No growth at 5 days.

## 2017-07-29 LAB
ALBUMIN SERPL ELPH-MCNC: 2.9 G/DL — LOW (ref 3.3–5.2)
ALP SERPL-CCNC: 95 U/L — SIGNIFICANT CHANGE UP (ref 40–120)
ALT FLD-CCNC: 33 U/L — SIGNIFICANT CHANGE UP
ANION GAP SERPL CALC-SCNC: 13 MMOL/L — SIGNIFICANT CHANGE UP (ref 5–17)
AST SERPL-CCNC: 33 U/L — SIGNIFICANT CHANGE UP
BILIRUB SERPL-MCNC: 0.4 MG/DL — SIGNIFICANT CHANGE UP (ref 0.4–2)
BUN SERPL-MCNC: 11 MG/DL — SIGNIFICANT CHANGE UP (ref 8–20)
CALCIUM SERPL-MCNC: 8.5 MG/DL — LOW (ref 8.6–10.2)
CHLORIDE SERPL-SCNC: 106 MMOL/L — SIGNIFICANT CHANGE UP (ref 98–107)
CO2 SERPL-SCNC: 22 MMOL/L — SIGNIFICANT CHANGE UP (ref 22–29)
CREAT SERPL-MCNC: 0.86 MG/DL — SIGNIFICANT CHANGE UP (ref 0.5–1.3)
GLUCOSE SERPL-MCNC: 92 MG/DL — SIGNIFICANT CHANGE UP (ref 70–115)
MAGNESIUM SERPL-MCNC: 1.7 MG/DL — SIGNIFICANT CHANGE UP (ref 1.6–2.6)
PHOSPHATE SERPL-MCNC: 2.4 MG/DL — SIGNIFICANT CHANGE UP (ref 2.4–4.7)
POTASSIUM SERPL-MCNC: 3.7 MMOL/L — SIGNIFICANT CHANGE UP (ref 3.5–5.3)
POTASSIUM SERPL-SCNC: 3.7 MMOL/L — SIGNIFICANT CHANGE UP (ref 3.5–5.3)
PROT SERPL-MCNC: 6.1 G/DL — LOW (ref 6.6–8.7)
SODIUM SERPL-SCNC: 141 MMOL/L — SIGNIFICANT CHANGE UP (ref 135–145)

## 2017-07-29 PROCEDURE — 99233 SBSQ HOSP IP/OBS HIGH 50: CPT

## 2017-07-29 RX ORDER — MAGNESIUM SULFATE 500 MG/ML
1 VIAL (ML) INJECTION ONCE
Qty: 0 | Refills: 0 | Status: COMPLETED | OUTPATIENT
Start: 2017-07-29 | End: 2017-07-29

## 2017-07-29 RX ORDER — TRAMADOL HYDROCHLORIDE 50 MG/1
50 TABLET ORAL EVERY 6 HOURS
Qty: 0 | Refills: 0 | Status: DISCONTINUED | OUTPATIENT
Start: 2017-07-29 | End: 2017-07-31

## 2017-07-29 RX ORDER — POTASSIUM CHLORIDE 20 MEQ
40 PACKET (EA) ORAL ONCE
Qty: 0 | Refills: 0 | Status: COMPLETED | OUTPATIENT
Start: 2017-07-29 | End: 2017-07-29

## 2017-07-29 RX ORDER — SODIUM CHLORIDE 9 MG/ML
250 INJECTION INTRAMUSCULAR; INTRAVENOUS; SUBCUTANEOUS ONCE
Qty: 0 | Refills: 0 | Status: COMPLETED | OUTPATIENT
Start: 2017-07-29 | End: 2017-07-29

## 2017-07-29 RX ADMIN — MAGNESIUM OXIDE 400 MG ORAL TABLET 400 MILLIGRAM(S): 241.3 TABLET ORAL at 09:45

## 2017-07-29 RX ADMIN — NYSTATIN CREAM 1 APPLICATION(S): 100000 CREAM TOPICAL at 17:20

## 2017-07-29 RX ADMIN — PIPERACILLIN AND TAZOBACTAM 25 GRAM(S): 4; .5 INJECTION, POWDER, LYOPHILIZED, FOR SOLUTION INTRAVENOUS at 05:21

## 2017-07-29 RX ADMIN — MAGNESIUM OXIDE 400 MG ORAL TABLET 400 MILLIGRAM(S): 241.3 TABLET ORAL at 17:20

## 2017-07-29 RX ADMIN — NYSTATIN CREAM 1 APPLICATION(S): 100000 CREAM TOPICAL at 05:21

## 2017-07-29 RX ADMIN — Medication 40 MILLIEQUIVALENT(S): at 17:22

## 2017-07-29 RX ADMIN — SODIUM CHLORIDE 500 MILLILITER(S): 9 INJECTION INTRAMUSCULAR; INTRAVENOUS; SUBCUTANEOUS at 23:59

## 2017-07-29 RX ADMIN — LISINOPRIL 5 MILLIGRAM(S): 2.5 TABLET ORAL at 05:21

## 2017-07-29 RX ADMIN — Medication 100 GRAM(S): at 17:20

## 2017-07-29 RX ADMIN — TAMSULOSIN HYDROCHLORIDE 0.4 MILLIGRAM(S): 0.4 CAPSULE ORAL at 22:38

## 2017-07-29 RX ADMIN — PIPERACILLIN AND TAZOBACTAM 25 GRAM(S): 4; .5 INJECTION, POWDER, LYOPHILIZED, FOR SOLUTION INTRAVENOUS at 22:38

## 2017-07-29 RX ADMIN — MAGNESIUM OXIDE 400 MG ORAL TABLET 400 MILLIGRAM(S): 241.3 TABLET ORAL at 13:12

## 2017-07-29 RX ADMIN — PIPERACILLIN AND TAZOBACTAM 25 GRAM(S): 4; .5 INJECTION, POWDER, LYOPHILIZED, FOR SOLUTION INTRAVENOUS at 15:11

## 2017-07-29 NOTE — PROGRESS NOTE ADULT - PROBLEM SELECTOR PLAN 2
toxic metabolic encephalopathy - improving  ct antibiotics for UTI - E fecalis --> switched to zosyn per ID  f/u US renal to r/o hydronephrosis/pyelonephritis  ID recs appreciated toxic metabolic encephalopathy - improving  ct antibiotics for UTI - E fecalis & CAP --> switched to zosyn per ID  f/u US renal to r/o hydronephrosis/pyelonephritis  ID recs appreciated

## 2017-07-29 NOTE — PROGRESS NOTE ADULT - SUBJECTIVE AND OBJECTIVE BOX
CHIEF COMPLAINT/INTERVAL HISTORY:    Patient is a 85y old  Male who presents with a chief complaint of feeling generally ill (22 Jul 2017 23:06)      HPI:  85yr old male with PMH of HTN. Dementia, Pacemaker for arrythmia, ?COPD was brought by wife to ER due to altered consciousness. At the time of my interview, no family available to provide details,  information obtained from ER physician and EMR. Pt obtunded, unable to provide any history.   In the ER , he was found to be hypothermic to 93F and Tachypneic, hypoxic on RA improved later. CXR suspicious for rt and LLL infiltrates, UA - dirty. He is being admitted for Sepsis with AMS possibly 2/2 pneumonia/ UTI. Hammonds was inserted in ER. CT head not done at the time of interview. (22 Jul 2017 18:27)      SUBJECTIVE & OBJECTIVE: Pt seen and examined at bedside. c/o chronic lower back pain. Deneis any other complaints, no neuro deficits. no saddle anesthesia    ICU Vital Signs Last 24 Hrs  T(C): 36.4 (29 Jul 2017 16:33), Max: 36.9 (28 Jul 2017 18:12)  T(F): 97.6 (29 Jul 2017 16:33), Max: 98.4 (28 Jul 2017 18:12)  HR: 65 (29 Jul 2017 16:33) (59 - 65)  BP: 110/62 (29 Jul 2017 16:33) (92/54 - 110/62)  BP(mean): --  ABP: --  ABP(mean): --  RR: 17 (29 Jul 2017 16:33) (16 - 18)  SpO2: 98% (29 Jul 2017 13:12) (98% - 98%)        MEDICATIONS  (STANDING):  magnesium oxide 400 milliGRAM(s) Oral three times a day with meals  nystatin Powder 1 Application(s) Topical two times a day  tamsulosin 0.4 milliGRAM(s) Oral at bedtime  enoxaparin Injectable 40 milliGRAM(s) SubCutaneous daily  acetaminophen  IVPB 1000 milliGRAM(s) IV Intermittent once  lisinopril 5 milliGRAM(s) Oral daily  dextrose 5% + sodium chloride 0.45%. 1000 milliLiter(s) (100 mL/Hr) IV Continuous <Continuous>  piperacillin/tazobactam IVPB. 3.375 Gram(s) IV Intermittent every 8 hours    MEDICATIONS  (PRN):  ALBUTerol/ipratropium for Nebulization 3 milliLiter(s) Nebulizer every 6 hours PRN Shortness of Breath and/or Wheezing  acetaminophen   Tablet. 650 milliGRAM(s) Oral every 6 hours PRN Mild Pain (1 - 3)      LABS:                        12.7   6.2   )-----------( 97       ( 28 Jul 2017 07:38 )             37.7     07-29    141  |  106  |  11.0  ----------------------------<  92  3.7   |  22.0  |  0.86    Ca    8.5<L>      29 Jul 2017 08:18  Phos  2.4     07-29  Mg     1.7     07-29    TPro  6.1<L>  /  Alb  2.9<L>  /  TBili  0.4  /  DBili  x   /  AST  33  /  ALT  33  /  AlkPhos  95  07-29          CAPILLARY BLOOD GLUCOSE          RECENT CULTURES:      RADIOLOGY & ADDITIONAL TESTS:      PHYSICAL EXAM:    GENERAL: NAD, confused, awake, alert  HEENT: PERRL, +EOMI  CHEST/LUNG: Clear to percussion bilaterally; No wheezing  HEART: S1S2+, Regular rate and rhythm; No murmurs, rubs, or gallops  ABDOMEN: Soft, Nontender, Nondistended  EXTREMITIES: No clubbing, cyanosis, or edema  NEURO: AAOX1-2, moves extremities

## 2017-07-29 NOTE — PROGRESS NOTE ADULT - PROBLEM SELECTOR PLAN 5
2/2 e fecalis UTI  hess yet present today, was d/aileen on 7/25 as per hospitalist, TOV today  flomax with h/o BPH - per wife - multiple UTIs 2/2 e fecalis UTI & Gram+ve/Gram-ve CAP  hess yet present today, was d/aileen on 7/25 as per hospitalist, TOV today  flomax with h/o BPH - per wife - multiple UTIs

## 2017-07-30 LAB
ALBUMIN SERPL ELPH-MCNC: 3 G/DL — LOW (ref 3.3–5.2)
ALP SERPL-CCNC: 93 U/L — SIGNIFICANT CHANGE UP (ref 40–120)
ALT FLD-CCNC: 32 U/L — SIGNIFICANT CHANGE UP
ANION GAP SERPL CALC-SCNC: 11 MMOL/L — SIGNIFICANT CHANGE UP (ref 5–17)
AST SERPL-CCNC: 32 U/L — SIGNIFICANT CHANGE UP
BILIRUB SERPL-MCNC: 0.4 MG/DL — SIGNIFICANT CHANGE UP (ref 0.4–2)
BUN SERPL-MCNC: 14 MG/DL — SIGNIFICANT CHANGE UP (ref 8–20)
CALCIUM SERPL-MCNC: 8.6 MG/DL — SIGNIFICANT CHANGE UP (ref 8.6–10.2)
CHLORIDE SERPL-SCNC: 110 MMOL/L — HIGH (ref 98–107)
CO2 SERPL-SCNC: 23 MMOL/L — SIGNIFICANT CHANGE UP (ref 22–29)
CREAT SERPL-MCNC: 0.82 MG/DL — SIGNIFICANT CHANGE UP (ref 0.5–1.3)
GLUCOSE SERPL-MCNC: 86 MG/DL — SIGNIFICANT CHANGE UP (ref 70–115)
MAGNESIUM SERPL-MCNC: 1.9 MG/DL — SIGNIFICANT CHANGE UP (ref 1.8–2.6)
PHOSPHATE SERPL-MCNC: 2 MG/DL — LOW (ref 2.4–4.7)
POTASSIUM SERPL-MCNC: 4 MMOL/L — SIGNIFICANT CHANGE UP (ref 3.5–5.3)
POTASSIUM SERPL-SCNC: 4 MMOL/L — SIGNIFICANT CHANGE UP (ref 3.5–5.3)
PROT SERPL-MCNC: 6.3 G/DL — LOW (ref 6.6–8.7)
SODIUM SERPL-SCNC: 144 MMOL/L — SIGNIFICANT CHANGE UP (ref 135–145)

## 2017-07-30 PROCEDURE — 71010: CPT | Mod: 26

## 2017-07-30 PROCEDURE — 99233 SBSQ HOSP IP/OBS HIGH 50: CPT

## 2017-07-30 RX ORDER — LISINOPRIL 2.5 MG/1
5 TABLET ORAL DAILY
Qty: 0 | Refills: 0 | Status: DISCONTINUED | OUTPATIENT
Start: 2017-07-30 | End: 2017-08-03

## 2017-07-30 RX ORDER — SODIUM CHLORIDE 9 MG/ML
250 INJECTION INTRAMUSCULAR; INTRAVENOUS; SUBCUTANEOUS ONCE
Qty: 0 | Refills: 0 | Status: COMPLETED | OUTPATIENT
Start: 2017-07-30 | End: 2017-07-30

## 2017-07-30 RX ADMIN — PIPERACILLIN AND TAZOBACTAM 25 GRAM(S): 4; .5 INJECTION, POWDER, LYOPHILIZED, FOR SOLUTION INTRAVENOUS at 22:37

## 2017-07-30 RX ADMIN — NYSTATIN CREAM 1 APPLICATION(S): 100000 CREAM TOPICAL at 17:53

## 2017-07-30 RX ADMIN — MAGNESIUM OXIDE 400 MG ORAL TABLET 400 MILLIGRAM(S): 241.3 TABLET ORAL at 09:04

## 2017-07-30 RX ADMIN — MAGNESIUM OXIDE 400 MG ORAL TABLET 400 MILLIGRAM(S): 241.3 TABLET ORAL at 11:46

## 2017-07-30 RX ADMIN — TAMSULOSIN HYDROCHLORIDE 0.4 MILLIGRAM(S): 0.4 CAPSULE ORAL at 22:37

## 2017-07-30 RX ADMIN — PIPERACILLIN AND TAZOBACTAM 25 GRAM(S): 4; .5 INJECTION, POWDER, LYOPHILIZED, FOR SOLUTION INTRAVENOUS at 15:15

## 2017-07-30 RX ADMIN — SODIUM CHLORIDE 500 MILLILITER(S): 9 INJECTION INTRAMUSCULAR; INTRAVENOUS; SUBCUTANEOUS at 09:15

## 2017-07-30 RX ADMIN — PIPERACILLIN AND TAZOBACTAM 25 GRAM(S): 4; .5 INJECTION, POWDER, LYOPHILIZED, FOR SOLUTION INTRAVENOUS at 05:12

## 2017-07-30 RX ADMIN — NYSTATIN CREAM 1 APPLICATION(S): 100000 CREAM TOPICAL at 05:12

## 2017-07-30 RX ADMIN — ENOXAPARIN SODIUM 40 MILLIGRAM(S): 100 INJECTION SUBCUTANEOUS at 11:46

## 2017-07-30 RX ADMIN — MAGNESIUM OXIDE 400 MG ORAL TABLET 400 MILLIGRAM(S): 241.3 TABLET ORAL at 17:53

## 2017-07-30 NOTE — PROGRESS NOTE ADULT - PROBLEM SELECTOR PLAN 2
toxic metabolic encephalopathy - improving  ct antibiotics for UTI - E fecalis & CAP --> switched to zosyn per ID  f/u US renal to r/o hydronephrosis/pyelonephritis  ID recs appreciated

## 2017-07-30 NOTE — PROGRESS NOTE ADULT - SUBJECTIVE AND OBJECTIVE BOX
CHIEF COMPLAINT/INTERVAL HISTORY:    Patient is a 85y old  Male who presents with a chief complaint of feeling generally ill (22 Jul 2017 23:06)      HPI:  85yr old male with PMH of HTN. Dementia, Pacemaker for arrythmia, ?COPD was brought by wife to ER due to altered consciousness. At the time of my interview, no family available to provide details,  information obtained from ER physician and EMR. Pt obtunded, unable to provide any history.   In the ER , he was found to be hypothermic to 93F and Tachypneic, hypoxic on RA improved later. CXR suspicious for rt and LLL infiltrates, UA - dirty. He is being admitted for Sepsis with AMS possibly 2/2 pneumonia/ UTI. Hammonds was inserted in ER. CT head not done at the time of interview. (22 Jul 2017 18:27)      SUBJECTIVE & OBJECTIVE: Pt seen and examined at bedside. SBP was 80's this am, Fluid bolus given, now in 100's. Pt asymptomatic. Passed TOV yesterday. No chest pain, palpitations, sob, light headedness/dizziness, difficulty breathing/cough, fevers/chills, abdominal pain, n/v, diarrhea/constipation, dysuria or increased urinary frequency.      ICU Vital Signs Last 24 Hrs  T(C): 36.6 (30 Jul 2017 07:00), Max: 36.6 (30 Jul 2017 07:00)  T(F): 97.9 (30 Jul 2017 07:00), Max: 97.9 (30 Jul 2017 07:00)  HR: 70 (30 Jul 2017 10:48) (61 - 70)  BP: 104/68 (30 Jul 2017 10:48) (62/- - 110/62)  BP(mean): --  ABP: --  ABP(mean): --  RR: 16 (30 Jul 2017 07:00) (16 - 17)  SpO2: 96% (29 Jul 2017 23:02) (96% - 96%)        MEDICATIONS  (STANDING):  magnesium oxide 400 milliGRAM(s) Oral three times a day with meals  nystatin Powder 1 Application(s) Topical two times a day  tamsulosin 0.4 milliGRAM(s) Oral at bedtime  enoxaparin Injectable 40 milliGRAM(s) SubCutaneous daily  acetaminophen  IVPB 1000 milliGRAM(s) IV Intermittent once  dextrose 5% + sodium chloride 0.45%. 1000 milliLiter(s) (100 mL/Hr) IV Continuous <Continuous>  piperacillin/tazobactam IVPB. 3.375 Gram(s) IV Intermittent every 8 hours  lisinopril 5 milliGRAM(s) Oral daily    MEDICATIONS  (PRN):  ALBUTerol/ipratropium for Nebulization 3 milliLiter(s) Nebulizer every 6 hours PRN Shortness of Breath and/or Wheezing  acetaminophen   Tablet. 650 milliGRAM(s) Oral every 6 hours PRN Mild Pain (1 - 3)  traMADol 50 milliGRAM(s) Oral every 6 hours PRN Moderate Pain (4 - 6)      LABS:    07-30    144  |  110<H>  |  14.0  ----------------------------<  86  4.0   |  23.0  |  0.82    Ca    8.6      30 Jul 2017 07:14  Phos  2.0     07-30  Mg     1.9     07-30    TPro  6.3<L>  /  Alb  3.0<L>  /  TBili  0.4  /  DBili  x   /  AST  32  /  ALT  32  /  AlkPhos  93  07-30          CAPILLARY BLOOD GLUCOSE          RECENT CULTURES:      RADIOLOGY & ADDITIONAL TESTS:      PHYSICAL EXAM:      GENERAL: NAD, confused, awake, alert  HEENT: PERRL, +EOMI  CHEST/LUNG: Clear to percussion bilaterally; No wheezing  HEART: S1S2+, Regular rate and rhythm; No murmurs, rubs, or gallops  ABDOMEN: Soft, Nontender, Nondistended  EXTREMITIES: No clubbing, cyanosis, or edema  NEURO: AAOX1-2, moves extremities

## 2017-07-30 NOTE — PROGRESS NOTE ADULT - PROBLEM SELECTOR PLAN 5
2/2 e fecalis UTI & Gram+ve/Gram-ve CAP  hess yet present today, was d/aileen on 7/25 as per hospitalist, passed TOV yesterday  flomax with h/o BPH - per wife - multiple UTIs

## 2017-07-31 LAB
ALBUMIN SERPL ELPH-MCNC: 3.3 G/DL — SIGNIFICANT CHANGE UP (ref 3.3–5.2)
ALP SERPL-CCNC: 98 U/L — SIGNIFICANT CHANGE UP (ref 40–120)
ALT FLD-CCNC: 29 U/L — SIGNIFICANT CHANGE UP
ANION GAP SERPL CALC-SCNC: 15 MMOL/L — SIGNIFICANT CHANGE UP (ref 5–17)
AST SERPL-CCNC: 27 U/L — SIGNIFICANT CHANGE UP
BILIRUB SERPL-MCNC: 0.6 MG/DL — SIGNIFICANT CHANGE UP (ref 0.4–2)
BUN SERPL-MCNC: 11 MG/DL — SIGNIFICANT CHANGE UP (ref 8–20)
CALCIUM SERPL-MCNC: 8.6 MG/DL — SIGNIFICANT CHANGE UP (ref 8.6–10.2)
CHLORIDE SERPL-SCNC: 105 MMOL/L — SIGNIFICANT CHANGE UP (ref 98–107)
CO2 SERPL-SCNC: 23 MMOL/L — SIGNIFICANT CHANGE UP (ref 22–29)
CREAT SERPL-MCNC: 0.84 MG/DL — SIGNIFICANT CHANGE UP (ref 0.5–1.3)
GLUCOSE SERPL-MCNC: 95 MG/DL — SIGNIFICANT CHANGE UP (ref 70–115)
MAGNESIUM SERPL-MCNC: 1.8 MG/DL — SIGNIFICANT CHANGE UP (ref 1.6–2.6)
PHOSPHATE SERPL-MCNC: 2 MG/DL — LOW (ref 2.4–4.7)
POTASSIUM SERPL-MCNC: 3.8 MMOL/L — SIGNIFICANT CHANGE UP (ref 3.5–5.3)
POTASSIUM SERPL-SCNC: 3.8 MMOL/L — SIGNIFICANT CHANGE UP (ref 3.5–5.3)
PROT SERPL-MCNC: 6.7 G/DL — SIGNIFICANT CHANGE UP (ref 6.6–8.7)
SODIUM SERPL-SCNC: 143 MMOL/L — SIGNIFICANT CHANGE UP (ref 135–145)

## 2017-07-31 PROCEDURE — 99232 SBSQ HOSP IP/OBS MODERATE 35: CPT

## 2017-07-31 PROCEDURE — 70450 CT HEAD/BRAIN W/O DYE: CPT | Mod: 26

## 2017-07-31 PROCEDURE — 99233 SBSQ HOSP IP/OBS HIGH 50: CPT

## 2017-07-31 RX ADMIN — TAMSULOSIN HYDROCHLORIDE 0.4 MILLIGRAM(S): 0.4 CAPSULE ORAL at 21:39

## 2017-07-31 RX ADMIN — ENOXAPARIN SODIUM 40 MILLIGRAM(S): 100 INJECTION SUBCUTANEOUS at 14:56

## 2017-07-31 RX ADMIN — Medication 63.75 MILLIMOLE(S): at 18:37

## 2017-07-31 RX ADMIN — PIPERACILLIN AND TAZOBACTAM 25 GRAM(S): 4; .5 INJECTION, POWDER, LYOPHILIZED, FOR SOLUTION INTRAVENOUS at 14:56

## 2017-07-31 RX ADMIN — MAGNESIUM OXIDE 400 MG ORAL TABLET 400 MILLIGRAM(S): 241.3 TABLET ORAL at 08:41

## 2017-07-31 RX ADMIN — LISINOPRIL 5 MILLIGRAM(S): 2.5 TABLET ORAL at 05:36

## 2017-07-31 RX ADMIN — NYSTATIN CREAM 1 APPLICATION(S): 100000 CREAM TOPICAL at 05:36

## 2017-07-31 RX ADMIN — PIPERACILLIN AND TAZOBACTAM 25 GRAM(S): 4; .5 INJECTION, POWDER, LYOPHILIZED, FOR SOLUTION INTRAVENOUS at 05:36

## 2017-07-31 RX ADMIN — PIPERACILLIN AND TAZOBACTAM 25 GRAM(S): 4; .5 INJECTION, POWDER, LYOPHILIZED, FOR SOLUTION INTRAVENOUS at 23:47

## 2017-07-31 RX ADMIN — NYSTATIN CREAM 1 APPLICATION(S): 100000 CREAM TOPICAL at 18:33

## 2017-07-31 RX ADMIN — MAGNESIUM OXIDE 400 MG ORAL TABLET 400 MILLIGRAM(S): 241.3 TABLET ORAL at 18:32

## 2017-07-31 NOTE — PROGRESS NOTE ADULT - SUBJECTIVE AND OBJECTIVE BOX
CHIEF COMPLAINT/INTERVAL HISTORY:    Patient is a 85y old  Male who presents with a chief complaint of feeling generally ill (22 Jul 2017 23:06)      HPI:  85yr old male with PMH of HTN. Dementia, Pacemaker for arrythmia, ?COPD was brought by wife to ER due to altered consciousness. At the time of my interview, no family available to provide details,  information obtained from ER physician and EMR. Pt obtunded, unable to provide any history.   In the ER , he was found to be hypothermic to 93F and Tachypneic, hypoxic on RA improved later. CXR suspicious for rt and LLL infiltrates, UA - dirty. He is being admitted for Sepsis with AMS possibly 2/2 pneumonia/ UTI. Hammonds was inserted in ER. CT head not done at the time of interview. (22 Jul 2017 18:27)      SUBJECTIVE & OBJECTIVE: Pt seen and examined at bedside. Pt on warming blanket with rectal temp 96. Appearing very lethargic, arousable to tactile stimuli. BP stable.     ICU Vital Signs Last 24 Hrs  T(C): 35.6 (31 Jul 2017 17:00), Max: 37.2 (30 Jul 2017 18:04)  T(F): 96 (31 Jul 2017 17:00), Max: 99 (30 Jul 2017 18:04)  HR: 60 (31 Jul 2017 15:00) (60 - 91)  BP: 70/49 (31 Jul 2017 15:00) (70/49 - 122/62)  BP(mean): --  ABP: --  ABP(mean): --  RR: 17 (31 Jul 2017 15:00) (15 - 17)  SpO2: 98% (31 Jul 2017 15:00) (97% - 100%)        MEDICATIONS  (STANDING):  magnesium oxide 400 milliGRAM(s) Oral three times a day with meals  nystatin Powder 1 Application(s) Topical two times a day  tamsulosin 0.4 milliGRAM(s) Oral at bedtime  enoxaparin Injectable 40 milliGRAM(s) SubCutaneous daily  acetaminophen  IVPB 1000 milliGRAM(s) IV Intermittent once  piperacillin/tazobactam IVPB. 3.375 Gram(s) IV Intermittent every 8 hours  lisinopril 5 milliGRAM(s) Oral daily  sodium phosphate IVPB 15 milliMole(s) IV Intermittent once    MEDICATIONS  (PRN):  ALBUTerol/ipratropium for Nebulization 3 milliLiter(s) Nebulizer every 6 hours PRN Shortness of Breath and/or Wheezing  acetaminophen   Tablet. 650 milliGRAM(s) Oral every 6 hours PRN Mild Pain (1 - 3)      LABS:    07-31    143  |  105  |  11.0  ----------------------------<  95  3.8   |  23.0  |  0.84    Ca    8.6      31 Jul 2017 06:17  Phos  2.0     07-31  Mg     1.8     07-31    TPro  6.7  /  Alb  3.3  /  TBili  0.6  /  DBili  x   /  AST  27  /  ALT  29  /  AlkPhos  98  07-31          CAPILLARY BLOOD GLUCOSE  229 (30 Jul 2017 21:18)          RECENT CULTURES:      RADIOLOGY & ADDITIONAL TESTS:        PHYSICAL EXAM:      GENERAL: Appearing very lethargic, arousable to tactile stimuli, +warming blanket  HEENT: PERRL, +EOMI  CHEST/LUNG: Clear to percussion bilaterally; No wheezing  HEART: S1S2+, Regular rate and rhythm; No murmurs, rubs, or gallops  ABDOMEN: Soft, Nontender, Nondistended  EXTREMITIES: No clubbing, cyanosis, or edema  NEURO: Appearing very lethargic, arousable to tactile stimuli.

## 2017-07-31 NOTE — PROGRESS NOTE ADULT - PROBLEM SELECTOR PLAN 2
toxic metabolic encephalopathy - improving then now pt appearing lethargic once again  ct antibiotics for UTI - E fecalis & CAP --> switched to zosyn per ID day 5, will continue  f/u US renal to r/o hydronephrosis/pyelonephritis  ID recs appreciated  lytes wnl  Repeat CT head, B12, folate, TSH, RPR  D/c tramadol for now

## 2017-07-31 NOTE — PROGRESS NOTE ADULT - SUBJECTIVE AND OBJECTIVE BOX
[facility]  INFECTIOUS DISEASES AND INTERNAL MEDICINE [facility]  =======================================================  Song Villegas MD  Diplomates American Board of Internal Medicine and Infectious Diseases  =======================================================      TONIA ABDUL [id number]      Follow up: Fever / UTI    Afebrile      Allergies:  Sulfur Precipitated (Hives)      Antibiotics:  piperacillin/tazobactam IVPB. 3.375 Gram(s) IV Intermittent every 8 hours      REVIEW OF SYSTEMS:  Unable to obtain, patient only opens eyes to physical stimuli      Physical Exam:  Vital Signs Last 24 Hrs  T(C): 36.1 (31 Jul 2017 17:57), Max: 37 (31 Jul 2017 07:37)  T(F): 97 (31 Jul 2017 17:57), Max: 98.6 (31 Jul 2017 07:37)  HR: 72 (31 Jul 2017 17:57) (60 - 91)  BP: 88/58 (31 Jul 2017 17:57) (70/49 - 122/62)  RR: 17 (31 Jul 2017 15:00) (15 - 17)  SpO2: 98% (31 Jul 2017 15:00) (98% - 100%)      GEN: opens eyes to physical stimuli.  HEENT: normocephalic and atraumatic.   NECK: Supple.   LUNGS: Clear to auscultation.  HEART: Regular rate and rhythm.  ABDOMEN: Soft, nontender, and nondistended.  Positive bowel sounds.    : No CVA tenderness  EXTREMITIES: trace edema.  MSK: No joint swelling  NEUROLOGIC: opens eyes to physical stimuli.  PSYCHIATRIC: unable to assess  SKIN: multiple bruises on the skin      Labs:  07-28    143  |  109<H>  |  17.0  ----------------------------<  114  3.9   |  23.0  |  1.02    Ca    8.6      28 Jul 2017 07:38  Mg     1.9     07-28                          12.7   6.2   )-----------( 97       ( 28 Jul 2017 07:38 )             37.7       RECENT CULTURES:  Culture - Blood in AM (07.28.17 @ 07:38)    Specimen Source: .Blood Blood    Culture Results:   No growth at 48 hours      Culture - Blood (07.27.17 @ 21:29)    Specimen Source: .Blood Blood    Culture Results:   No growth at 48 hours      Culture - Urine (07.22.17 @ 15:45)    -  Ampicillin: S <=2    -  Nitrofurantoin: S <=32    -  Tetra/Doxy: R >8    -  Vancomycin: S 1    Specimen Source: .Urine Catheterized    Culture Results:   >100,000 CFU/ml Enterococcus faecalis    Organism Identification: Enterococcus faecalis    Organism: Enterococcus faecalis    Method Type: ROMAN      Culture - Blood (07.22.17 @ 13:20)    Specimen Source: .Blood Blood-Peripheral    Culture Results:   No growth at 5 days.      Culture - Blood (07.22.17 @ 13:20)    Specimen Source: .Blood Blood-Peripheral    Culture Results:   No growth at 5 days.        EXAM:  US KIDNEY(S)                        PROCEDURE DATE:  07/28/2017    INTERPRETATION:  CLINICAL HISTORY: Renal insufficiency.  TECHNIQUE: Renal Sonography.   COMPARISON: None  FINDINGS: The right kidney displays increased cortical echogenicity   consistent with chronic medical renal disease. The right kidney measures   10.6 cm in longitudinal dimension. No right hydronephrosis, mass or   calculi is visualized. There is mild fullness in the renal pelvis. No   caliectasis.  The left kidney displays increased cortical echogenicity consistent with   chronic medical renal disease. The left kidney measures 10.8 cm in   longitudinal dimension. No right hydronephrosis, mass or calculi is   visualized.   The bladder is shows irregular thickened posterior wall trabeculation.   Bilateral ureteral jets visualized. Dependent debris within the bladder   noted.  Incidental note is made of multiple gallstones without findings of acute   cholecystitis.  IMPRESSION:  Bilateral increased cortical echogenicity consistent with chronic medical   renal disease.  No hydronephrosis, mass or calculus. Mild fullness of right renal pelvis.   No caliectasis.  Posterior bladder wall thickening and dependent debris. Patient unable to   turn to assess mobility of debris.  Incidental finding of cholelithiasis.

## 2017-08-01 DIAGNOSIS — G93.40 ENCEPHALOPATHY, UNSPECIFIED: ICD-10-CM

## 2017-08-01 LAB
ALBUMIN SERPL ELPH-MCNC: 3.3 G/DL — SIGNIFICANT CHANGE UP (ref 3.3–5.2)
ALP SERPL-CCNC: 95 U/L — SIGNIFICANT CHANGE UP (ref 40–120)
ALT FLD-CCNC: 29 U/L — SIGNIFICANT CHANGE UP
ANION GAP SERPL CALC-SCNC: 15 MMOL/L — SIGNIFICANT CHANGE UP (ref 5–17)
AST SERPL-CCNC: 30 U/L — SIGNIFICANT CHANGE UP
BASOPHILS # BLD AUTO: 0 K/UL — SIGNIFICANT CHANGE UP (ref 0–0.2)
BILIRUB SERPL-MCNC: 0.5 MG/DL — SIGNIFICANT CHANGE UP (ref 0.4–2)
BUN SERPL-MCNC: 13 MG/DL — SIGNIFICANT CHANGE UP (ref 8–20)
CALCIUM SERPL-MCNC: 9 MG/DL — SIGNIFICANT CHANGE UP (ref 8.6–10.2)
CHLORIDE SERPL-SCNC: 105 MMOL/L — SIGNIFICANT CHANGE UP (ref 98–107)
CO2 SERPL-SCNC: 24 MMOL/L — SIGNIFICANT CHANGE UP (ref 22–29)
CREAT SERPL-MCNC: 0.82 MG/DL — SIGNIFICANT CHANGE UP (ref 0.5–1.3)
EOSINOPHIL # BLD AUTO: 0 K/UL — SIGNIFICANT CHANGE UP (ref 0–0.5)
EOSINOPHIL NFR BLD AUTO: 5 % — SIGNIFICANT CHANGE UP (ref 0–6)
FOLATE SERPL-MCNC: 8.8 NG/ML — SIGNIFICANT CHANGE UP (ref 4–16)
GLUCOSE SERPL-MCNC: 82 MG/DL — SIGNIFICANT CHANGE UP (ref 70–115)
HCT VFR BLD CALC: 41.1 % — LOW (ref 42–52)
HGB BLD-MCNC: 14.1 G/DL — SIGNIFICANT CHANGE UP (ref 14–18)
LYMPHOCYTES # BLD AUTO: 0.1 K/UL — LOW (ref 1–4.8)
LYMPHOCYTES # BLD AUTO: 12 % — LOW (ref 20–55)
MAGNESIUM SERPL-MCNC: 1.9 MG/DL — SIGNIFICANT CHANGE UP (ref 1.8–2.6)
MCHC RBC-ENTMCNC: 32.4 PG — HIGH (ref 27–31)
MCHC RBC-ENTMCNC: 34.3 G/DL — SIGNIFICANT CHANGE UP (ref 32–36)
MCV RBC AUTO: 94.5 FL — HIGH (ref 80–94)
MONOCYTES # BLD AUTO: 0.1 K/UL — SIGNIFICANT CHANGE UP (ref 0–0.8)
MONOCYTES NFR BLD AUTO: 11 % — HIGH (ref 3–10)
NEUTROPHILS # BLD AUTO: 0.5 K/UL — LOW (ref 1.8–8)
NEUTROPHILS NFR BLD AUTO: 71 % — SIGNIFICANT CHANGE UP (ref 37–73)
PHOSPHATE SERPL-MCNC: 2.3 MG/DL — LOW (ref 2.4–4.7)
PLAT MORPH BLD: NORMAL — SIGNIFICANT CHANGE UP
PLATELET # BLD AUTO: 191 K/UL — SIGNIFICANT CHANGE UP (ref 150–400)
POTASSIUM SERPL-MCNC: 4.1 MMOL/L — SIGNIFICANT CHANGE UP (ref 3.5–5.3)
POTASSIUM SERPL-SCNC: 4.1 MMOL/L — SIGNIFICANT CHANGE UP (ref 3.5–5.3)
PROT SERPL-MCNC: 6.9 G/DL — SIGNIFICANT CHANGE UP (ref 6.6–8.7)
RBC # BLD: 4.35 M/UL — LOW (ref 4.6–6.2)
RBC # FLD: 14.3 % — SIGNIFICANT CHANGE UP (ref 11–15.6)
RBC BLD AUTO: NORMAL — SIGNIFICANT CHANGE UP
RPR SERPL-ACNC: SIGNIFICANT CHANGE UP
SODIUM SERPL-SCNC: 144 MMOL/L — SIGNIFICANT CHANGE UP (ref 135–145)
TSH SERPL-MCNC: 0.89 UIU/ML — SIGNIFICANT CHANGE UP (ref 0.27–4.2)
VARIANT LYMPHS # BLD: 1 % — SIGNIFICANT CHANGE UP (ref 0–6)
VIT B12 SERPL-MCNC: 1088 PG/ML — HIGH (ref 180–914)
WBC # BLD: 6.6 K/UL — SIGNIFICANT CHANGE UP (ref 4.8–10.8)
WBC # FLD AUTO: 6.6 K/UL — SIGNIFICANT CHANGE UP (ref 4.8–10.8)

## 2017-08-01 PROCEDURE — 99233 SBSQ HOSP IP/OBS HIGH 50: CPT

## 2017-08-01 PROCEDURE — 99232 SBSQ HOSP IP/OBS MODERATE 35: CPT

## 2017-08-01 RX ORDER — SODIUM CHLORIDE 9 MG/ML
500 INJECTION INTRAMUSCULAR; INTRAVENOUS; SUBCUTANEOUS ONCE
Qty: 0 | Refills: 0 | Status: COMPLETED | OUTPATIENT
Start: 2017-08-01 | End: 2017-08-01

## 2017-08-01 RX ORDER — SODIUM,POTASSIUM PHOSPHATES 278-250MG
1 POWDER IN PACKET (EA) ORAL
Qty: 0 | Refills: 0 | Status: COMPLETED | OUTPATIENT
Start: 2017-08-01 | End: 2017-08-01

## 2017-08-01 RX ADMIN — ENOXAPARIN SODIUM 40 MILLIGRAM(S): 100 INJECTION SUBCUTANEOUS at 11:10

## 2017-08-01 RX ADMIN — MAGNESIUM OXIDE 400 MG ORAL TABLET 400 MILLIGRAM(S): 241.3 TABLET ORAL at 17:39

## 2017-08-01 RX ADMIN — Medication 1 TABLET(S): at 21:42

## 2017-08-01 RX ADMIN — TAMSULOSIN HYDROCHLORIDE 0.4 MILLIGRAM(S): 0.4 CAPSULE ORAL at 21:42

## 2017-08-01 RX ADMIN — LISINOPRIL 5 MILLIGRAM(S): 2.5 TABLET ORAL at 05:42

## 2017-08-01 RX ADMIN — NYSTATIN CREAM 1 APPLICATION(S): 100000 CREAM TOPICAL at 17:39

## 2017-08-01 RX ADMIN — MAGNESIUM OXIDE 400 MG ORAL TABLET 400 MILLIGRAM(S): 241.3 TABLET ORAL at 08:36

## 2017-08-01 RX ADMIN — SODIUM CHLORIDE 500 MILLILITER(S): 9 INJECTION INTRAMUSCULAR; INTRAVENOUS; SUBCUTANEOUS at 09:56

## 2017-08-01 RX ADMIN — Medication 1 TABLET(S): at 17:39

## 2017-08-01 RX ADMIN — MAGNESIUM OXIDE 400 MG ORAL TABLET 400 MILLIGRAM(S): 241.3 TABLET ORAL at 11:11

## 2017-08-01 RX ADMIN — NYSTATIN CREAM 1 APPLICATION(S): 100000 CREAM TOPICAL at 05:42

## 2017-08-01 RX ADMIN — PIPERACILLIN AND TAZOBACTAM 25 GRAM(S): 4; .5 INJECTION, POWDER, LYOPHILIZED, FOR SOLUTION INTRAVENOUS at 05:42

## 2017-08-01 RX ADMIN — Medication 650 MILLIGRAM(S): at 07:00

## 2017-08-01 RX ADMIN — PIPERACILLIN AND TAZOBACTAM 25 GRAM(S): 4; .5 INJECTION, POWDER, LYOPHILIZED, FOR SOLUTION INTRAVENOUS at 21:42

## 2017-08-01 RX ADMIN — PIPERACILLIN AND TAZOBACTAM 25 GRAM(S): 4; .5 INJECTION, POWDER, LYOPHILIZED, FOR SOLUTION INTRAVENOUS at 14:03

## 2017-08-01 RX ADMIN — Medication 650 MILLIGRAM(S): at 05:52

## 2017-08-01 NOTE — PROGRESS NOTE ADULT - PROBLEM SELECTOR PLAN 2
toxic metabolic encephalopathy - improving then now pt appearing lethargic once again  ct antibiotics for UTI - E fecalis & CAP --> switched to zosyn per ID day 6/7, will continue  f/u US renal to r/o hydronephrosis/pyelonephritis  ID recs appreciated  lytes wnl  Repeat CT head, B12, folate, TSH wnl, RPR   D/c tramadol for now, avoid opiates  Neurology called for further management  unable to do MRI due to PPM

## 2017-08-01 NOTE — CONSULT NOTE ADULT - SUBJECTIVE AND OBJECTIVE BOX
HPI: 86yo LH male poor historian and not cooperative seen at bedside with wife where pt has h/o of HTN. Dementia, Pacemaker for arrythmia, ?COPD was brought by wife to ER due to altered consciousness.  Pt has been seen by Dr Meyers in our office 6/15/17.  Pt was diagnosed with UTI and pneumonia and undergoing antibiotics regimen.  Doing better with mental status changes at times without LOC or seizure related activity.  No focal motor/sensory changes noted.  No reports of head injuries.      PAST MEDICAL & SURGICAL HISTORY:  Pacemaker  Dementia  CAD (coronary artery disease)  HLD (hyperlipidemia)  HTN (hypertension)  COPD (chronic obstructive pulmonary disease)  History of transurethral prostatectomy: transurethral needle ablation /TUNA of prostate 4 years ago  Hernia, epigastric    MEDICATIONS  (STANDING):  magnesium oxide 400 milliGRAM(s) Oral three times a day with meals  nystatin Powder 1 Application(s) Topical two times a day  tamsulosin 0.4 milliGRAM(s) Oral at bedtime  enoxaparin Injectable 40 milliGRAM(s) SubCutaneous daily  acetaminophen  IVPB 1000 milliGRAM(s) IV Intermittent once  piperacillin/tazobactam IVPB. 3.375 Gram(s) IV Intermittent every 8 hours  lisinopril 5 milliGRAM(s) Oral daily  potassium acid phosphate/sodium acid phosphate tablet (K-PHOS No. 2) 1 Tablet(s) Oral four times a day with meals    MEDICATIONS  (PRN):  ALBUTerol/ipratropium for Nebulization 3 milliLiter(s) Nebulizer every 6 hours PRN Shortness of Breath and/or Wheezing  acetaminophen   Tablet. 650 milliGRAM(s) Oral every 6 hours PRN Mild Pain (1 - 3)    Allergies    Sulfur Precipitated (Hives)    Intolerances        FAMILY HISTORY:  No pertinent family history in first degree relatives          SOCIAL HISTORY:  Denies toxic habits;     REVIEW OF SYSTEMS:    As noted in the HPI.    VITAL SIGNS:  Vital Signs Last 24 Hrs  T(C): 36.8 (01 Aug 2017 08:51), Max: 36.8 (01 Aug 2017 08:51)  T(F): 98.3 (01 Aug 2017 08:51), Max: 98.3 (01 Aug 2017 08:51)  HR: 67 (01 Aug 2017 05:25) (60 - 72)  BP: 80/52 (01 Aug 2017 08:51) (70/49 - 112/60)  BP(mean): --  RR: 18 (01 Aug 2017 08:51) (17 - 18)  SpO2: 98% (01 Aug 2017 08:51) (98% - 100%)    PHYSICAL EXAMINATION:  General: Well-developed, well nourished, in no acute distress.  Eyes: Conjunctiva and sclera clear. Fundoscopic examination was deferred.  Neck: Supple.  Cardiac: +S1 & S2; Regular.  Chest: Rhonchi b/l.    Musculoskeletal: No tenderness on palpation of spine.  No Brudzinski/Kernig's sign.    Neurologic:  - Mental Status:  Awake and alert; Speech is fair with his wife as he refused to talk to me  Cranial Nerves II-XII: Pupil 3mm irregular; + Corneal; + Eomi; - facial.  - Motor:  Symmetric/spontanous throughout.  Normal muscle bulk and tone throughout.  - Reflexes:  1+ and symmetric throughout.  Plantars neg b/l  - Sensory:  Intact to pin prick sense.  - Coordination:  Pt refused.   - Gait: Deferred.      LABS:                          14.1   6.6   )-----------( x        ( 01 Aug 2017 06:59 )             41.1     01 Aug 2017 06:59    144    |  105    |  13.0   ----------------------------<  82     4.1     |  24.0   |  0.82     Ca    9.0        01 Aug 2017 06:59  Phos  2.3       01 Aug 2017 06:59  Mg     1.9       01 Aug 2017 06:59    TPro  6.9    /  Alb  3.3    /  TBili  0.5    /  DBili  x      /  AST  30     /  ALT  29     /  AlkPhos  95     01 Aug 2017 06:59    LIVER FUNCTIONS - ( 01 Aug 2017 06:59 )  Alb: 3.3 g/dL / Pro: 6.9 g/dL / ALK PHOS: 95 U/L / ALT: 29 U/L / AST: 30 U/L / GGT: x           Vit B12 - 1088  TSH - 0.89  Folate - 8.8  RPR - NR      RADIOLOGY & ADDITIONAL STUDIES:      CT Head No Cont (07.31.17 @ 22:16) -  No acute intracranial findings.  Mild atrophy and chronic white matter microvascular ischemic changes as   described.     If acute stroke is of clinical concern, MRI with diffusion-weighted   images would be helpful for further characterization.      CT Head No Cont (07.22.17 @ 18:42) -  Motion degraded study. No acute intracranial hemorrhage or mass effect,   given the extent of artifact. There is no obvious large cortical infarct,   however, complete evaluation of infarctis limited due to patient motion.   If clinically indicated and when patient is able to cooperate after   adequate sedation, a short-term follow-up or an MRI may be obtained for   further evaluation.         IMPRESSION:  Encephalopathy due to sepsis/metabolic derangement in setting of dementia.

## 2017-08-01 NOTE — PROGRESS NOTE ADULT - SUBJECTIVE AND OBJECTIVE BOX
CHIEF COMPLAINT/INTERVAL HISTORY:    Patient is a 85y old  Male who presents with a chief complaint of feeling generally ill (22 Jul 2017 23:06)      HPI:  85yr old male with PMH of HTN. Dementia, Pacemaker for arrythmia, ?COPD was brought by wife to ER due to altered consciousness. At the time of my interview, no family available to provide details,  information obtained from ER physician and EMR. Pt obtunded, unable to provide any history.   In the ER , he was found to be hypothermic to 93F and Tachypneic, hypoxic on RA improved later. CXR suspicious for rt and LLL infiltrates, UA - dirty. He is being admitted for Sepsis with AMS possibly 2/2 pneumonia/ UTI. Hammonds was inserted in ER. CT head not done at the time of interview. (22 Jul 2017 18:27)      SUBJECTIVE & OBJECTIVE: Pt seen and examined at bedside. Pt more awake today but not verbalizing or opening eyes. Moves extremities & nodding head to questions. As per wife was walking & verbal at home.     ICU Vital Signs Last 24 Hrs  T(C): 36.8 (01 Aug 2017 08:51), Max: 36.8 (01 Aug 2017 08:51)  T(F): 98.3 (01 Aug 2017 08:51), Max: 98.3 (01 Aug 2017 08:51)  HR: 67 (01 Aug 2017 05:25) (60 - 72)  BP: 80/52 (01 Aug 2017 08:51) (70/49 - 112/60)  BP(mean): --  ABP: --  ABP(mean): --  RR: 18 (01 Aug 2017 08:51) (17 - 18)  SpO2: 98% (01 Aug 2017 08:51) (98% - 100%)        MEDICATIONS  (STANDING):  magnesium oxide 400 milliGRAM(s) Oral three times a day with meals  nystatin Powder 1 Application(s) Topical two times a day  tamsulosin 0.4 milliGRAM(s) Oral at bedtime  enoxaparin Injectable 40 milliGRAM(s) SubCutaneous daily  acetaminophen  IVPB 1000 milliGRAM(s) IV Intermittent once  piperacillin/tazobactam IVPB. 3.375 Gram(s) IV Intermittent every 8 hours  lisinopril 5 milliGRAM(s) Oral daily  potassium acid phosphate/sodium acid phosphate tablet (K-PHOS No. 2) 1 Tablet(s) Oral four times a day with meals    MEDICATIONS  (PRN):  ALBUTerol/ipratropium for Nebulization 3 milliLiter(s) Nebulizer every 6 hours PRN Shortness of Breath and/or Wheezing  acetaminophen   Tablet. 650 milliGRAM(s) Oral every 6 hours PRN Mild Pain (1 - 3)      LABS:                        14.1   6.6   )-----------( x        ( 01 Aug 2017 06:59 )             41.1     08-01    144  |  105  |  13.0  ----------------------------<  82  4.1   |  24.0  |  0.82    Ca    9.0      01 Aug 2017 06:59  Phos  2.3     08-01  Mg     1.9     08-01    TPro  6.9  /  Alb  3.3  /  TBili  0.5  /  DBili  x   /  AST  30  /  ALT  29  /  AlkPhos  95  08-01          CAPILLARY BLOOD GLUCOSE          RECENT CULTURES:      RADIOLOGY & ADDITIONAL TESTS:        PHYSICAL EXAM:      GENERAL: NAD  HEENT: PERRL, +EOMI  CHEST/LUNG: Clear to percussion bilaterally; No wheezing  HEART: S1S2+, Regular rate and rhythm; No murmurs, rubs, or gallops  ABDOMEN: Soft, Nontender, Nondistended  EXTREMITIES: No clubbing, cyanosis, or edema  NEURO: more awake today but not verbalizing or opening eyes. Moves extremities & nodding head to questions

## 2017-08-01 NOTE — PROGRESS NOTE ADULT - SUBJECTIVE AND OBJECTIVE BOX
[facility]  INFECTIOUS DISEASES AND INTERNAL MEDICINE [facility]  =======================================================  Song Villegas MD  Diplomates American Board of Internal Medicine and Infectious Diseases  =======================================================      TONIA ABDUL [id number]      Follow up: Fever / UTI    Afebrile      Allergies:  Sulfur Precipitated (Hives)      Antibiotics:  piperacillin/tazobactam IVPB. 3.375 Gram(s) IV Intermittent every 8 hours      REVIEW OF SYSTEMS:  Unable to obtain, patient only opens eyes to physical stimuli      Physical Exam:  Vital Signs Last 24 Hrs  T(C): 36.8 (01 Aug 2017 08:51), Max: 36.8 (01 Aug 2017 08:51)  T(F): 98.3 (01 Aug 2017 08:51), Max: 98.3 (01 Aug 2017 08:51)  HR: 67 (01 Aug 2017 05:25) (64 - 72)  BP: 80/52 (01 Aug 2017 08:51) (80/52 - 112/60)  RR: 18 (01 Aug 2017 08:51) (18 - 18)  SpO2: 98% (01 Aug 2017 08:51) (98% - 100%)      GEN: opens eyes to physical stimuli.  HEENT: normocephalic and atraumatic.   NECK: Supple.   LUNGS: Clear to auscultation.  HEART: Regular rate and rhythm.  ABDOMEN: Soft, nontender, and nondistended.  Positive bowel sounds.    : No CVA tenderness  EXTREMITIES: trace edema.  MSK: No joint swelling  NEUROLOGIC: opens eyes to physical stimuli.  PSYCHIATRIC: unable to assess  SKIN: multiple bruises on the skin      Labs:  08-01    144  |  105  |  13.0  ----------------------------<  82  4.1   |  24.0  |  0.82    Ca    9.0      01 Aug 2017 06:59  Phos  2.3     08-01  Mg     1.9     08-01    TPro  6.9  /  Alb  3.3  /  TBili  0.5  /  DBili  x   /  AST  30  /  ALT  29  /  AlkPhos  95  08-01                          14.1   6.6   )-----------( 191      ( 01 Aug 2017 06:59 )             41.1       LIVER FUNCTIONS - ( 01 Aug 2017 06:59 )  Alb: 3.3 g/dL / Pro: 6.9 g/dL / ALK PHOS: 95 U/L / ALT: 29 U/L / AST: 30 U/L / GGT: x               RECENT CULTURES:  Culture - Blood in AM (07.28.17 @ 07:38)    Specimen Source: .Blood Blood    Culture Results:   No growth at 48 hours      Culture - Blood (07.27.17 @ 21:29)    Specimen Source: .Blood Blood    Culture Results:   No growth at 48 hours      Culture - Urine (07.22.17 @ 15:45)    -  Ampicillin: S <=2    -  Nitrofurantoin: S <=32    -  Tetra/Doxy: R >8    -  Vancomycin: S 1    Specimen Source: .Urine Catheterized    Culture Results:   >100,000 CFU/ml Enterococcus faecalis    Organism Identification: Enterococcus faecalis    Organism: Enterococcus faecalis    Method Type: ROMAN      Culture - Blood (07.22.17 @ 13:20)    Specimen Source: .Blood Blood-Peripheral    Culture Results:   No growth at 5 days.      Culture - Blood (07.22.17 @ 13:20)    Specimen Source: .Blood Blood-Peripheral    Culture Results:   No growth at 5 days.        EXAM:  US KIDNEY(S)                        PROCEDURE DATE:  07/28/2017    INTERPRETATION:  CLINICAL HISTORY: Renal insufficiency.  TECHNIQUE: Renal Sonography.   COMPARISON: None  FINDINGS: The right kidney displays increased cortical echogenicity   consistent with chronic medical renal disease. The right kidney measures   10.6 cm in longitudinal dimension. No right hydronephrosis, mass or   calculi is visualized. There is mild fullness in the renal pelvis. No   caliectasis.  The left kidney displays increased cortical echogenicity consistent with   chronic medical renal disease. The left kidney measures 10.8 cm in   longitudinal dimension. No right hydronephrosis, mass or calculi is   visualized.   The bladder is shows irregular thickened posterior wall trabeculation.   Bilateral ureteral jets visualized. Dependent debris within the bladder   noted.  Incidental note is made of multiple gallstones without findings of acute   cholecystitis.  IMPRESSION:  Bilateral increased cortical echogenicity consistent with chronic medical   renal disease.  No hydronephrosis, mass or calculus. Mild fullness of right renal pelvis.   No caliectasis.  Posterior bladder wall thickening and dependent debris. Patient unable to   turn to assess mobility of debris.  Incidental finding of cholelithiasis.

## 2017-08-02 DIAGNOSIS — F03.90 UNSPECIFIED DEMENTIA WITHOUT BEHAVIORAL DISTURBANCE: ICD-10-CM

## 2017-08-02 DIAGNOSIS — Z51.5 ENCOUNTER FOR PALLIATIVE CARE: ICD-10-CM

## 2017-08-02 LAB
ALBUMIN SERPL ELPH-MCNC: 3.3 G/DL — SIGNIFICANT CHANGE UP (ref 3.3–5.2)
ALP SERPL-CCNC: 94 U/L — SIGNIFICANT CHANGE UP (ref 40–120)
ALT FLD-CCNC: 25 U/L — SIGNIFICANT CHANGE UP
ANION GAP SERPL CALC-SCNC: 14 MMOL/L — SIGNIFICANT CHANGE UP (ref 5–17)
AST SERPL-CCNC: 24 U/L — SIGNIFICANT CHANGE UP
BASOPHILS # BLD AUTO: 0 K/UL — SIGNIFICANT CHANGE UP (ref 0–0.2)
BASOPHILS NFR BLD AUTO: 0.4 % — SIGNIFICANT CHANGE UP (ref 0–2)
BILIRUB SERPL-MCNC: 0.4 MG/DL — SIGNIFICANT CHANGE UP (ref 0.4–2)
BUN SERPL-MCNC: 13 MG/DL — SIGNIFICANT CHANGE UP (ref 8–20)
CALCIUM SERPL-MCNC: 9 MG/DL — SIGNIFICANT CHANGE UP (ref 8.6–10.2)
CHLORIDE SERPL-SCNC: 106 MMOL/L — SIGNIFICANT CHANGE UP (ref 98–107)
CO2 SERPL-SCNC: 24 MMOL/L — SIGNIFICANT CHANGE UP (ref 22–29)
CREAT SERPL-MCNC: 0.82 MG/DL — SIGNIFICANT CHANGE UP (ref 0.5–1.3)
CULTURE RESULTS: SIGNIFICANT CHANGE UP
CULTURE RESULTS: SIGNIFICANT CHANGE UP
EOSINOPHIL # BLD AUTO: 0.2 K/UL — SIGNIFICANT CHANGE UP (ref 0–0.5)
EOSINOPHIL NFR BLD AUTO: 3.2 % — SIGNIFICANT CHANGE UP (ref 0–6)
GLUCOSE SERPL-MCNC: 100 MG/DL — SIGNIFICANT CHANGE UP (ref 70–115)
HCT VFR BLD CALC: 40.1 % — LOW (ref 42–52)
HGB BLD-MCNC: 13.6 G/DL — LOW (ref 14–18)
LYMPHOCYTES # BLD AUTO: 1 K/UL — SIGNIFICANT CHANGE UP (ref 1–4.8)
LYMPHOCYTES # BLD AUTO: 13.2 % — LOW (ref 20–55)
MAGNESIUM SERPL-MCNC: 1.9 MG/DL — SIGNIFICANT CHANGE UP (ref 1.6–2.6)
MCHC RBC-ENTMCNC: 32 PG — HIGH (ref 27–31)
MCHC RBC-ENTMCNC: 33.9 G/DL — SIGNIFICANT CHANGE UP (ref 32–36)
MCV RBC AUTO: 94.4 FL — HIGH (ref 80–94)
MONOCYTES # BLD AUTO: 0.7 K/UL — SIGNIFICANT CHANGE UP (ref 0–0.8)
MONOCYTES NFR BLD AUTO: 8.9 % — SIGNIFICANT CHANGE UP (ref 3–10)
NEUTROPHILS # BLD AUTO: 5.5 K/UL — SIGNIFICANT CHANGE UP (ref 1.8–8)
NEUTROPHILS NFR BLD AUTO: 74 % — HIGH (ref 37–73)
PHOSPHATE SERPL-MCNC: 2.3 MG/DL — LOW (ref 2.4–4.7)
PLATELET # BLD AUTO: 254 K/UL — SIGNIFICANT CHANGE UP (ref 150–400)
POTASSIUM SERPL-MCNC: 3.5 MMOL/L — SIGNIFICANT CHANGE UP (ref 3.5–5.3)
POTASSIUM SERPL-SCNC: 3.5 MMOL/L — SIGNIFICANT CHANGE UP (ref 3.5–5.3)
PROT SERPL-MCNC: 6.9 G/DL — SIGNIFICANT CHANGE UP (ref 6.6–8.7)
RBC # BLD: 4.25 M/UL — LOW (ref 4.6–6.2)
RBC # FLD: 14 % — SIGNIFICANT CHANGE UP (ref 11–15.6)
SODIUM SERPL-SCNC: 144 MMOL/L — SIGNIFICANT CHANGE UP (ref 135–145)
SPECIMEN SOURCE: SIGNIFICANT CHANGE UP
SPECIMEN SOURCE: SIGNIFICANT CHANGE UP
WBC # BLD: 7.4 K/UL — SIGNIFICANT CHANGE UP (ref 4.8–10.8)
WBC # FLD AUTO: 7.4 K/UL — SIGNIFICANT CHANGE UP (ref 4.8–10.8)

## 2017-08-02 PROCEDURE — 99222 1ST HOSP IP/OBS MODERATE 55: CPT

## 2017-08-02 PROCEDURE — 99233 SBSQ HOSP IP/OBS HIGH 50: CPT

## 2017-08-02 RX ORDER — SODIUM,POTASSIUM PHOSPHATES 278-250MG
1 POWDER IN PACKET (EA) ORAL
Qty: 0 | Refills: 0 | Status: COMPLETED | OUTPATIENT
Start: 2017-08-02 | End: 2017-08-02

## 2017-08-02 RX ORDER — MIDODRINE HYDROCHLORIDE 2.5 MG/1
2.5 TABLET ORAL DAILY
Qty: 0 | Refills: 0 | Status: DISCONTINUED | OUTPATIENT
Start: 2017-08-02 | End: 2017-08-03

## 2017-08-02 RX ADMIN — PIPERACILLIN AND TAZOBACTAM 25 GRAM(S): 4; .5 INJECTION, POWDER, LYOPHILIZED, FOR SOLUTION INTRAVENOUS at 22:02

## 2017-08-02 RX ADMIN — PIPERACILLIN AND TAZOBACTAM 25 GRAM(S): 4; .5 INJECTION, POWDER, LYOPHILIZED, FOR SOLUTION INTRAVENOUS at 13:49

## 2017-08-02 RX ADMIN — ENOXAPARIN SODIUM 40 MILLIGRAM(S): 100 INJECTION SUBCUTANEOUS at 11:38

## 2017-08-02 RX ADMIN — MAGNESIUM OXIDE 400 MG ORAL TABLET 400 MILLIGRAM(S): 241.3 TABLET ORAL at 18:11

## 2017-08-02 RX ADMIN — TAMSULOSIN HYDROCHLORIDE 0.4 MILLIGRAM(S): 0.4 CAPSULE ORAL at 22:02

## 2017-08-02 RX ADMIN — Medication 1 TABLET(S): at 22:02

## 2017-08-02 RX ADMIN — PIPERACILLIN AND TAZOBACTAM 25 GRAM(S): 4; .5 INJECTION, POWDER, LYOPHILIZED, FOR SOLUTION INTRAVENOUS at 05:26

## 2017-08-02 RX ADMIN — NYSTATIN CREAM 1 APPLICATION(S): 100000 CREAM TOPICAL at 05:26

## 2017-08-02 RX ADMIN — MIDODRINE HYDROCHLORIDE 2.5 MILLIGRAM(S): 2.5 TABLET ORAL at 18:10

## 2017-08-02 RX ADMIN — Medication 1 TABLET(S): at 18:10

## 2017-08-02 RX ADMIN — LISINOPRIL 5 MILLIGRAM(S): 2.5 TABLET ORAL at 05:26

## 2017-08-02 RX ADMIN — MAGNESIUM OXIDE 400 MG ORAL TABLET 400 MILLIGRAM(S): 241.3 TABLET ORAL at 11:38

## 2017-08-02 RX ADMIN — MAGNESIUM OXIDE 400 MG ORAL TABLET 400 MILLIGRAM(S): 241.3 TABLET ORAL at 08:11

## 2017-08-02 RX ADMIN — NYSTATIN CREAM 1 APPLICATION(S): 100000 CREAM TOPICAL at 18:12

## 2017-08-02 NOTE — PROGRESS NOTE ADULT - PROBLEM SELECTOR PLAN 3
Afebrile now
Afebrile now
agan dropped temp   hyperthermia blanket  f/u blood cultures - NG  Ct antibiotics - for e fecalis UTI   supportive care
as above  f/u cultures  ct rocephin , azithromycin for possible CAP/ UTI  ct hess to monitor urine output for now
as above  f/u cultures  ct rocephin , azithromycin for possible CAP/ UTI  ct hess to monitor urine output for now - remove in am if HD stable
hyperthermia blanket prn  f/u blood cultures - NG  Ct antibiotics - for e fecalis UTI   supportive care
hyperthermia blanket prn  f/u blood cultures - NG , f/u repeat blood cultures  Ct antibiotics - for e fecalis UTI   supportive care
hypothermia blanket prn, current temp is 96 rectal, d/c hypothermia blanket  f/u blood cultures - NG , f/u repeat blood cultures  Ct antibiotics - for e fecalis UTI   supportive care  TSH wnl
hypothermia blanket prn, current temp is 96 rectal, d/c hypothermia blanket  f/u blood cultures - NG , f/u repeat blood cultures  Ct antibiotics - for e fecalis UTI   supportive care  TSH wnl
improved temp  f/u blood cultures - NG  Ct antibiotics - switch to Ampicillin for e fecalis UTI   supportive care
resolved  f/u blood cultures - NG  Ct antibiotics - for e fecalis UTI   supportive care  TSH wnl
Afebrile now

## 2017-08-02 NOTE — CONSULT NOTE ADULT - PROBLEM SELECTOR RECOMMENDATION 6
Met with patient. Mental status reported to be much improved today.  Discussed possible need for ICD. He has family members in the medical field with whom he will discuss and follow up with his cardiologist as well. Anxious to go home. Psychosocial support. Recommend further advance care planning as outpatient with PMD.

## 2017-08-02 NOTE — PROGRESS NOTE ADULT - PROBLEM SELECTOR PROBLEM 8
Alzheimer's disease of other onset
Essential hypertension
Essential hypertension
Alzheimer's disease of other onset

## 2017-08-02 NOTE — PROGRESS NOTE ADULT - PROBLEM SELECTOR PROBLEM 1
Acute systolic CHF (congestive heart failure)
Altered mental state
Altered mental state
UTI (urinary tract infection) due to Enterococcus

## 2017-08-02 NOTE — CONSULT NOTE ADULT - ASSESSMENT
85yr man, hx of CAD, PPM, EF <20%, dementia, admitted with AMS. Found to have UTI, PNA. Mental status now improved.
85yr old male with PMH of HTN. 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 Patient started on broad spectrum abx with clinical improvement. On telemetry episodes of ApVP with safety pacing due to PVCs in blanking period. PPM interrogation also noted for short   runs of  NSVT.  Patient currently with limited functional status >4 METs but uses walker for gait support. Pacemaker interrogation reveals normal device function. patient currently Ao x3 . He deneis; fever , chills,  chest pain orthopnea , PND (+) b/l trace edema (+) polyuria x 1 week.  All systems reviewed otherwise negative except as above.  Recc:    PPM interrogation wnl; patient currently completely pacer-dependent   Cxray : PNA>> HF  Recc: check 2-D echocardiogram , keep  K>4 mg >2 ; check pbnp  will follow with you
86 y/o M with Fever, CAP, and UTI with E faecalis

## 2017-08-02 NOTE — PROGRESS NOTE ADULT - PROBLEM SELECTOR PLAN 1
New EF drop on ECHo   Discussed with cardiolgy - treat UTI - No further w/u inpatient   May need Bi V upgrade eventually on outpt  clinically euvolemic
New EF drop on ECHo   Discussed with cardiolgy - treat UTI - No further w/u inpatient   May need Bi V upgrade eventually on outpt  clinically euvolemic
New EF drop on ECHo   Discussed with cardiolgy - treat UTI - may proceed with stress test  May need Bi V upgrade eventually  clinically euvolemic
New EF drop on ECHo   Discussed with cardiolgy - treat UTI - may proceed with stress test  May need Bi V upgrade eventually  clinically euvolemic today
New EF drop to 20% on ECHo   Discussed with cardiolgy - treat UTI - No further w/u inpatient   Ischemic eval and possible PPM upgrade to ICD as an outpatient once ID issues resolved.  clinically euvolemic  Keep  K>4 mg >2   Repeat cxray in am ; lasix low dose prn
New EF drop to 20% on ECHo   Discussed with cardiolgy - treat UTI - No further w/u inpatient   Ischemic eval and possible PPM upgrade to ICD as an outpatient once ID issues resolved.  clinically euvolemic  Keep  K>4 mg >2   Repeat cxray in am ; lasix low dose prn, hold for now, lisinopril with parameters  BP stable today
New EF drop to 20% on ECHo   Discussed with cardiolgy - treat UTI - No further w/u inpatient   Ischemic eval and possible PPM upgrade to ICD as an outpatient once ID issues resolved.  clinically euvolemic  Keep  K>4 mg >2   Repeat cxray in am ; lasix low dose prn, hold for now, lisinopril with parameters  SBP was 80's this am, Fluid bolus given, now in 100's. Pt asymptomatic currently
Urine cx with E faecalis  renal US result reviewed  Blood cultures no growth  Continue Zosyn, plan for 7 days
Urine cx with E faecalis  renal US result reviewed  Blood cultures no growth  Continue Zosyn, plan for 7 days, last day tomorrow
toxic metabolic encephalopathy  ct antibiotics for possible UTI/Pneumonia with toxic metabolic encephalopathy  monitor vitals
toxic metabolic encephalopathy  ct antibiotics for possible UTI/aspiration Pneumonia with toxic metabolic encephalopathy  monitor vitals -   NS bolus for hypotension -
Urine cx with E faecalis  Will do renal US  Blood cultures pending

## 2017-08-02 NOTE — CONSULT NOTE ADULT - PROBLEM SELECTOR RECOMMENDATION 2
Completing Zosyn
Patient received Ceftriaxone and Azithromycin  currently not hypoxic  Will hold off on doing CT Chest for now  CXR reviewed

## 2017-08-02 NOTE — PROGRESS NOTE ADULT - PROBLEM SELECTOR PLAN 9
monitor for now

## 2017-08-02 NOTE — PROGRESS NOTE ADULT - ATTENDING COMMENTS
Will follow.
Will sign off. Please call PRN
swallow at bedside -
Discussed  with wife &  family in details  Palliative consult called due to declining mental status
f/u palliative    D/c to [facility] in am
Discussed  with daughter & family in details
Will follow.

## 2017-08-02 NOTE — CONSULT NOTE ADULT - SUBJECTIVE AND OBJECTIVE BOX
Palliative Medicine Initial Consultation Note      HPI:  History from chart  85yr old male with PMH of HTN. Dementia, Pacemaker for arrythmia, ?COPD was brought by wife to ER due to altered consciousness. At the time of  interview, no family available to provide details,  information obtained from ER physician and EMR. Pt obtunded, unable to provide any history.   In the ER , he was found to be hypothermic to 93F and Tachypneic, hypoxic on RA improved later. CXR suspicious for rt and LLL infiltrates, UA - dirty. He is being admitted for Sepsis with AMS possibly 2/2 pneumonia/ UTI. Hess was inserted in ER. CT head not done at the time of interview.     Hospital course  Patient found to have UTI Enterococcus faclis, neg blood cultures and PNA. Seen by ID and placed on Zosyn. Mental status has since improved. TTE with EF <20% - recommended for ICD as outpatient.     PERTINENT PMH REVIEWED:  [x ] YES [ ] NO         CAD (coronary artery disease)    COPD (chronic obstructive pulmonary disease)    Dementia    HLD (hyperlipidemia)    HTN (hypertension)    Pacemaker.    Past Surgical History:  Hernia, epigastric    History of transurethral prostatectomy  transurethral needle ablation /TUNA of prostate 4 years ago.    SOCIAL HISTORY:  EtOH [ ] Yes  [ x] No                                    Drugs [ ] Yes [ x] No                                   [ ] smoker [ x] nonsmoker                                    Admitted from: [x ] home [ ] SNF _________ [ ] [facility] ________    Surrogate/HCP/Guardian: No HCP form in chart  , 1 son  Retired    FAMILY HISTORY:  No pertinent family history in first degree relatives      Baseline ADLs (prior to admission):  Independent [ ] moderately [ ] fully   Dependent   [ x] moderately [ ]fully    MEDICATIONS  (STANDING):  magnesium oxide 400 milliGRAM(s) Oral three times a day with meals  nystatin Powder 1 Application(s) Topical two times a day  tamsulosin 0.4 milliGRAM(s) Oral at bedtime  enoxaparin Injectable 40 milliGRAM(s) SubCutaneous daily  acetaminophen  IVPB 1000 milliGRAM(s) IV Intermittent once  piperacillin/tazobactam IVPB. 3.375 Gram(s) IV Intermittent every 8 hours  lisinopril 5 milliGRAM(s) Oral daily  midodrine 2.5 milliGRAM(s) Oral daily  potassium acid phosphate/sodium acid phosphate tablet (K-PHOS No. 2) 1 Tablet(s) Oral four times a day with meals    MEDICATIONS  (PRN):  ALBUTerol/ipratropium for Nebulization 3 milliLiter(s) Nebulizer every 6 hours PRN Shortness of Breath and/or Wheezing  acetaminophen   Tablet. 650 milliGRAM(s) Oral every 6 hours PRN Mild Pain (1 - 3)      Allergies    Sulfur Precipitated (Hives)    Intolerances        REVIEW OF SYSTEMS   See HPI  Unable to obtain ROS as patient obtunded time of ER visit.  Patient does not recall details of events leading to hospitalization     Karnofsky Performance Score/Palliative Performance Status Version 2:  40  %    Vital Signs Last 24 Hrs  T(C): 36.5 (02 Aug 2017 15:00), Max: 36.5 (02 Aug 2017 05:25)  T(F): 97.7 (02 Aug 2017 15:00), Max: 97.7 (02 Aug 2017 05:25)  HR: 66 (02 Aug 2017 15:00) (60 - 66)  BP: 85/66 (02 Aug 2017 15:00) (85/66 - 118/60)  BP(mean): --  RR: 18 (02 Aug 2017 15:00) (14 - 18)  SpO2: 93% (02 Aug 2017 15:00) (93% - 99%)    PHYSICAL EXAM:    General: [x ] alert  [x ] oriented x 3____ [ ] lethargic [ ] agitated                  [ ] cachexia  [ ] nonverbal  [ ] coma    HEENT: [ x] normal  [ ] dry mouth  [ ] ET tube/trach    Lungs: [x comfortable [ ] tachypnea/labored breathing  [ ] excessive secretions    CV: [ x] normal  [ ] tachycardia    GI: [x ] normal  [ ] distended  [ ] tender  [ ] no BS               [ ] PEG/NG/OG tube    : [x ] normal  [ ] incontinent  [ ] oliguria/anuria  [ ] hess    MSK: [ x] normal  [ ] weakness  [ ] edema             [ ] ambulatory  [ ] bedbound/wheelchair bound    Skin: [x ] normal  [ ] pressure ulcers- Stage_____  [ ] no rash    LABS:                        13.6   7.4   )-----------( 254      ( 02 Aug 2017 06:41 )             40.1     08-02    144  |  106  |  13.0  ----------------------------<  100  3.5   |  24.0  |  0.82    Ca    9.0      02 Aug 2017 06:41  Phos  2.3     08-02  Mg     1.9     08-02    TPro  6.9  /  Alb  3.3  /  TBili  0.4  /  DBili  x   /  AST  24  /  ALT  25  /  AlkPhos  94  08-02        I&O's Summary    01 Aug 2017 07:01  -  02 Aug 2017 07:00  --------------------------------------------------------  IN: 354 mL / OUT: 1 mL / NET: 353 mL    02 Aug 2017 07:01  -  02 Aug 2017 16:54  --------------------------------------------------------  IN: 440 mL / OUT: 0 mL / NET: 440 mL        RADIOLOGY & ADDITIONAL STUDIES:  < from: Xray Chest 1 View AP/PA. (07.22.17 @ 17:08) >  EXAM:  CHEST SINGLE VIEW FRONTAL                          PROCEDURE DATE:  07/22/2017          INTERPRETATION:  CLINICAL STATEMENT: Chest pain.    TECHNIQUE: AP view of the chest.    COMPARISON: 12/9/2016    FINDINGS/  IMPRESSION:  Left cardiac device again noted.    New increased interstitial lung markings with mild airspace opacities   lung bases. Probable small bilateral pleural effusions.    Nonspecific new prominence of bilateral hilum which could be related to   prominent pulmonary vessels. Follow-up recommended.    Heart size within normal limits.          < end of copied text >      < from: Xray Chest 1 View AP/PA. (07.30.17 @ 12:58) >   EXAM:  CHEST SINGLE VIEW FRONTAL                          PROCEDURE DATE:  07/30/2017          INTERPRETATION:  Chest, single portable view    HISTORY: Abnormal chest sounds    Comparison: 7/22    IMPRESSION:    Support Devices: None  Heart: Cardiomegaly, pacemaker  Mediastinum:  Unremarkable  Lungs/Airways:  Unremarkable  Bones/Soft tissues: Unremarkable          < end of copied text >      ADVANCE DIRECTIVES:  [ ] YES [x ] NO   DNR [ ] YES [x ] NO  Completed on:                     MOLST  [ ] YES [ x] NO   Completed on:  Living Will  [ ] YES [ x] NO   Completed on:      Thank you for the opportunity to assist with the care of this patient.   [facility] [phone].

## 2017-08-02 NOTE — PROGRESS NOTE ADULT - PROBLEM SELECTOR PROBLEM 4
Altered mental state
Altered mental state
ELENI (acute kidney injury)
ELENI (acute kidney injury)
Hypomagnesemia
Sepsis, due to unspecified organism
Sepsis, due to unspecified organism
Altered mental state

## 2017-08-02 NOTE — PROGRESS NOTE ADULT - PROBLEM SELECTOR PROBLEM 2
Altered mental state
Hypothermia, initial encounter
Hypothermia, initial encounter
Pneumonia, bacterial

## 2017-08-02 NOTE — PROGRESS NOTE ADULT - PROBLEM SELECTOR PROBLEM 3
Hypothermia, initial encounter
Sepsis, due to unspecified organism

## 2017-08-02 NOTE — PROGRESS NOTE ADULT - PROBLEM SELECTOR PLAN 2
toxic metabolic encephalopathy - improved significantly, off opiates  ct antibiotics for UTI - E fecalis & CAP --> last day of zosyn  f/u US renal to r/o hydronephrosis/pyelonephritis  ID recs appreciated  lytes wnl  Repeat CT head, B12, folate, TSH wnl, RPR wnl  D/aileen tramadol for now, avoid opiates  Neurology consult signed off

## 2017-08-02 NOTE — PROGRESS NOTE ADULT - SUBJECTIVE AND OBJECTIVE BOX
CHIEF COMPLAINT/INTERVAL HISTORY:    Patient is a 85y old  Male who presents with a chief complaint of feeling generally ill (22 Jul 2017 23:06)      HPI:  85yr old male with PMH of HTN. Dementia, Pacemaker for arrythmia, ?COPD was brought by wife to ER due to altered consciousness. At the time of my interview, no family available to provide details,  information obtained from ER physician and EMR. Pt obtunded, unable to provide any history.   In the ER , he was found to be hypothermic to 93F and Tachypneic, hypoxic on RA improved later. CXR suspicious for rt and LLL infiltrates, UA - dirty. He is being admitted for Sepsis with AMS possibly 2/2 pneumonia/ UTI. Hammonds was inserted in ER. CT head not done at the time of interview. (22 Jul 2017 18:27)      SUBJECTIVE & OBJECTIVE: Pt seen and examined at bedside. Pt awake alert & talking today. Denies any overnight issues.     ICU Vital Signs Last 24 Hrs  T(C): 36.4 (02 Aug 2017 08:30), Max: 36.5 (02 Aug 2017 05:25)  T(F): 97.6 (02 Aug 2017 08:30), Max: 97.7 (02 Aug 2017 05:25)  HR: 62 (02 Aug 2017 08:45) (60 - 64)  BP: 104/62 (02 Aug 2017 08:45) (104/62 - 118/60)  BP(mean): --  ABP: --  ABP(mean): --  RR: 14 (02 Aug 2017 08:45) (14 - 18)  SpO2: 97% (02 Aug 2017 08:45) (96% - 99%)        MEDICATIONS  (STANDING):  magnesium oxide 400 milliGRAM(s) Oral three times a day with meals  nystatin Powder 1 Application(s) Topical two times a day  tamsulosin 0.4 milliGRAM(s) Oral at bedtime  enoxaparin Injectable 40 milliGRAM(s) SubCutaneous daily  acetaminophen  IVPB 1000 milliGRAM(s) IV Intermittent once  piperacillin/tazobactam IVPB. 3.375 Gram(s) IV Intermittent every 8 hours  lisinopril 5 milliGRAM(s) Oral daily  midodrine 2.5 milliGRAM(s) Oral daily  potassium acid phosphate/sodium acid phosphate tablet (K-PHOS No. 2) 1 Tablet(s) Oral four times a day with meals    MEDICATIONS  (PRN):  ALBUTerol/ipratropium for Nebulization 3 milliLiter(s) Nebulizer every 6 hours PRN Shortness of Breath and/or Wheezing  acetaminophen   Tablet. 650 milliGRAM(s) Oral every 6 hours PRN Mild Pain (1 - 3)      LABS:                        13.6   7.4   )-----------( 254      ( 02 Aug 2017 06:41 )             40.1     08-02    144  |  106  |  13.0  ----------------------------<  100  3.5   |  24.0  |  0.82    Ca    9.0      02 Aug 2017 06:41  Phos  2.3     08-02  Mg     1.9     08-02    TPro  6.9  /  Alb  3.3  /  TBili  0.4  /  DBili  x   /  AST  24  /  ALT  25  /  AlkPhos  94  08-02          CAPILLARY BLOOD GLUCOSE          RECENT CULTURES:      RADIOLOGY & ADDITIONAL TESTS:      PHYSICAL EXAM:    GENERAL: NAD  HEENT: PERRL, +EOMI  CHEST/LUNG: Clear to percussion bilaterally; No wheezing  HEART: S1S2+, Regular rate and rhythm; No murmurs, rubs, or gallops  ABDOMEN: Soft, Nontender, Nondistended  EXTREMITIES: No clubbing, cyanosis, or edema  NEURO: awake alert & talking today, answering questions, AAO x 2

## 2017-08-02 NOTE — PROGRESS NOTE ADULT - NSHPATTENDINGPLANDISCUSS_GEN_ALL_CORE
Dr Higgins
Dr Higgins
wife at bedside, EP
wife, RN
rn, pt
rn, wife
wife, RN
Dr Brothers
rn, family, ID
rn, pt
wife at bedside, RN , cardio/EP, ID
wife, RN, Pt, cardiology
rn, family, neuro
rn, pt, ID

## 2017-08-02 NOTE — CONSULT NOTE ADULT - PROBLEM SELECTOR RECOMMENDATION 9
Continue with medical/ID treatment with abx.  Continue Aricept.  Avoid sedatives.  DVT prophylaxis.  D/w wife in detail.  Outpatient follow up at [facility] at [phone] or [phone] with primary treating neurologist Dr Meyers.  Reconsult PRN.
Improved
Urine cx with E faecalis  Will do renal US  Ceftriaxone would not treat this so will switch to Zosyn  Repeat blood cultures given fever this morning

## 2017-08-02 NOTE — PROGRESS NOTE ADULT - PROBLEM SELECTOR PLAN 5
2/2 e fecalis UTI & Gram+ve/Gram-ve CAP  hess was d/aileen, passed TOV  flomax with h/o BPH - per wife - multiple UTIs

## 2017-08-02 NOTE — PROGRESS NOTE ADULT - PROBLEM SELECTOR PLAN 4
2/2  pre-renal 2/2 sepsis/UTI  improving  ct iv fluids
2/2 e fecalis UTI  DC hess   flomax with h/o BPH - per wife - multiple UTIs
2/2 e fecalis UTI  hess dced 7/25 - voiding well  flomax with h/o BPH - per wife - multiple UTIs
Due to above vs worsening dementia
Due to above vs worsening dementia vs tramadol
replete
suspect pre-renal 2/2 sepsis  ct iv fluids  f/u BMP in am
Due to above vs worsening dementia

## 2017-08-03 ENCOUNTER — TRANSCRIPTION ENCOUNTER (OUTPATIENT)
Age: 82
End: 2017-08-03

## 2017-08-03 VITALS
HEART RATE: 64 BPM | TEMPERATURE: 98 F | RESPIRATION RATE: 16 BRPM | SYSTOLIC BLOOD PRESSURE: 98 MMHG | DIASTOLIC BLOOD PRESSURE: 60 MMHG | OXYGEN SATURATION: 95 %

## 2017-08-03 LAB
ALBUMIN SERPL ELPH-MCNC: 3.3 G/DL — SIGNIFICANT CHANGE UP (ref 3.3–5.2)
ALP SERPL-CCNC: 90 U/L — SIGNIFICANT CHANGE UP (ref 40–120)
ALT FLD-CCNC: 22 U/L — SIGNIFICANT CHANGE UP
ANION GAP SERPL CALC-SCNC: 15 MMOL/L — SIGNIFICANT CHANGE UP (ref 5–17)
AST SERPL-CCNC: 20 U/L — SIGNIFICANT CHANGE UP
BASOPHILS # BLD AUTO: 0 K/UL — SIGNIFICANT CHANGE UP (ref 0–0.2)
BASOPHILS NFR BLD AUTO: 0.3 % — SIGNIFICANT CHANGE UP (ref 0–2)
BILIRUB SERPL-MCNC: 0.4 MG/DL — SIGNIFICANT CHANGE UP (ref 0.4–2)
BUN SERPL-MCNC: 19 MG/DL — SIGNIFICANT CHANGE UP (ref 8–20)
CALCIUM SERPL-MCNC: 9.1 MG/DL — SIGNIFICANT CHANGE UP (ref 8.6–10.2)
CHLORIDE SERPL-SCNC: 104 MMOL/L — SIGNIFICANT CHANGE UP (ref 98–107)
CO2 SERPL-SCNC: 24 MMOL/L — SIGNIFICANT CHANGE UP (ref 22–29)
CREAT SERPL-MCNC: 1.03 MG/DL — SIGNIFICANT CHANGE UP (ref 0.5–1.3)
EOSINOPHIL # BLD AUTO: 0.2 K/UL — SIGNIFICANT CHANGE UP (ref 0–0.5)
EOSINOPHIL NFR BLD AUTO: 2.6 % — SIGNIFICANT CHANGE UP (ref 0–5)
GLUCOSE SERPL-MCNC: 84 MG/DL — SIGNIFICANT CHANGE UP (ref 70–115)
HCT VFR BLD CALC: 38.7 % — LOW (ref 42–52)
HGB BLD-MCNC: 13 G/DL — LOW (ref 14–18)
LYMPHOCYTES # BLD AUTO: 1.1 K/UL — SIGNIFICANT CHANGE UP (ref 1–4.8)
LYMPHOCYTES # BLD AUTO: 17.7 % — LOW (ref 20–55)
MAGNESIUM SERPL-MCNC: 1.8 MG/DL — SIGNIFICANT CHANGE UP (ref 1.6–2.6)
MCHC RBC-ENTMCNC: 31.6 PG — HIGH (ref 27–31)
MCHC RBC-ENTMCNC: 33.6 G/DL — SIGNIFICANT CHANGE UP (ref 32–36)
MCV RBC AUTO: 93.9 FL — SIGNIFICANT CHANGE UP (ref 80–94)
MONOCYTES # BLD AUTO: 0.6 K/UL — SIGNIFICANT CHANGE UP (ref 0–0.8)
MONOCYTES NFR BLD AUTO: 9.2 % — SIGNIFICANT CHANGE UP (ref 3–10)
NEUTROPHILS # BLD AUTO: 4.2 K/UL — SIGNIFICANT CHANGE UP (ref 1.8–8)
NEUTROPHILS NFR BLD AUTO: 70 % — SIGNIFICANT CHANGE UP (ref 37–73)
PHOSPHATE SERPL-MCNC: 2.5 MG/DL — SIGNIFICANT CHANGE UP (ref 2.4–4.7)
PLATELET # BLD AUTO: 249 K/UL — SIGNIFICANT CHANGE UP (ref 150–400)
POTASSIUM SERPL-MCNC: 3.5 MMOL/L — SIGNIFICANT CHANGE UP (ref 3.5–5.3)
POTASSIUM SERPL-SCNC: 3.5 MMOL/L — SIGNIFICANT CHANGE UP (ref 3.5–5.3)
PROT SERPL-MCNC: 6.7 G/DL — SIGNIFICANT CHANGE UP (ref 6.6–8.7)
RBC # BLD: 4.12 M/UL — LOW (ref 4.6–6.2)
RBC # FLD: 14.1 % — SIGNIFICANT CHANGE UP (ref 11–15.6)
SODIUM SERPL-SCNC: 143 MMOL/L — SIGNIFICANT CHANGE UP (ref 135–145)
WBC # BLD: 6.1 K/UL — SIGNIFICANT CHANGE UP (ref 4.8–10.8)
WBC # FLD AUTO: 6.1 K/UL — SIGNIFICANT CHANGE UP (ref 4.8–10.8)

## 2017-08-03 PROCEDURE — 99239 HOSP IP/OBS DSCHRG MGMT >30: CPT

## 2017-08-03 RX ORDER — ACETAMINOPHEN 500 MG
2 TABLET ORAL
Qty: 0 | Refills: 0 | COMMUNITY
Start: 2017-08-03

## 2017-08-03 RX ORDER — MIDODRINE HYDROCHLORIDE 2.5 MG/1
1 TABLET ORAL
Qty: 0 | Refills: 0 | COMMUNITY
Start: 2017-08-03

## 2017-08-03 RX ORDER — TAMSULOSIN HYDROCHLORIDE 0.4 MG/1
1 CAPSULE ORAL
Qty: 0 | Refills: 0 | COMMUNITY
Start: 2017-08-03

## 2017-08-03 RX ORDER — MAGNESIUM OXIDE 400 MG ORAL TABLET 241.3 MG
1 TABLET ORAL
Qty: 0 | Refills: 0 | COMMUNITY
Start: 2017-08-03

## 2017-08-03 RX ADMIN — MAGNESIUM OXIDE 400 MG ORAL TABLET 400 MILLIGRAM(S): 241.3 TABLET ORAL at 12:56

## 2017-08-03 RX ADMIN — NYSTATIN CREAM 1 APPLICATION(S): 100000 CREAM TOPICAL at 05:27

## 2017-08-03 RX ADMIN — LISINOPRIL 5 MILLIGRAM(S): 2.5 TABLET ORAL at 05:27

## 2017-08-03 RX ADMIN — MAGNESIUM OXIDE 400 MG ORAL TABLET 400 MILLIGRAM(S): 241.3 TABLET ORAL at 17:35

## 2017-08-03 RX ADMIN — MIDODRINE HYDROCHLORIDE 2.5 MILLIGRAM(S): 2.5 TABLET ORAL at 15:23

## 2017-08-03 RX ADMIN — MAGNESIUM OXIDE 400 MG ORAL TABLET 400 MILLIGRAM(S): 241.3 TABLET ORAL at 12:58

## 2017-08-03 RX ADMIN — NYSTATIN CREAM 1 APPLICATION(S): 100000 CREAM TOPICAL at 17:35

## 2017-08-03 NOTE — DISCHARGE NOTE ADULT - CARE PROVIDER_API CALL
Salvatore Soto (MD; MPH), Cardiac Electrophysiology; Cardiovascular Disease; Internal Medicine  [address] [id number]  Phone: [phone]  Fax: [phone]    Bar Meyers (DO), Neurology  [address] [place] [address]  Phone: [phone]  Fax: [phone]

## 2017-08-03 NOTE — DISCHARGE NOTE ADULT - SECONDARY DIAGNOSIS.
Acute systolic CHF (congestive heart failure) ELENI (acute kidney injury) Encephalopathy CAD (coronary artery disease) Dementia Essential hypertension

## 2017-08-03 NOTE — DISCHARGE NOTE ADULT - HOSPITAL COURSE
85yr old male with PMH of HTN. Dementia, Pacemaker for arrythmia, ?COPD was brought by wife to ER due to altered consciousness. At the time of my interview, no family available to provide details,  information obtained from ER physician and EMR. Pt obtunded, unable to provide any history.   In the ER , he was found to be hypothermic to 93F and Tachypneic, hypoxic on RA improved later. CXR suspicious for rt and LLL infiltrates, UA - dirty. He is being admitted for Sepsis with AMS possibly 2/2 pneumonia/ UTI. Hess was inserted in ER. CT head neg for acute infarct. More awake , e/o Gram neg UTI on culture.   showing e/o NSVT and frequent PVCs - Cardiology consulted - ECHO - New EF drop  as compared to Dec 2016 EF <20%  await - treatment of sepsis . ID consulted. for recurrence of delirium, hypothermia on ampicillin, switched back to rocephin, PEr cardio - No further work up for now until UTI resolves. Can be done on outpt.   ID consulted - switched to zosyn on 7/27 - 7/28 & toradol stopped  - mentation improved.       Problem/Plan - 1:  ·  Problem: Acute systolic CHF (congestive heart failure).  Plan: New EF drop to 20% on ECHo   Discussed with cardiolgy - treat UTI - No further w/u inpatient   Ischemic eval and possible PPM upgrade to ICD as an outpatient once ID issues resolved.  clinically euvolemic  Keep  K>4 mg >2   Repeat cxray in am ; lasix low dose prn, hold for now, lisinopril with parameters  BP stable today.     Problem/Plan - 2:  ·  Problem: Altered mental state.  Plan: toxic metabolic encephalopathy - improved significantly, off opiates  ct antibiotics for UTI - E fecalis & CAP --> completed zosyn  f/u US renal to r/o hydronephrosis/pyelonephritis  ID recs appreciated  lytes wnl  Repeat CT head, B12, folate, TSH wnl, RPR wnl  D/aileen tramadol for now, avoid opiates  Neurology consult signed off.     Problem/Plan - 3:  ·  Problem: Hypothermia, initial encounter.  Plan: resolved  f/u blood cultures - NG  Ct antibiotics - for e fecalis UTI   supportive care  TSH wnl.     Problem/Plan - 4:  ·  Problem: Hypomagnesemia.  Plan: replete.     Problem/Plan - 5:  ·  Problem: Sepsis, due to unspecified organism.  Plan: 2/2 e fecalis UTI & Gram+ve/Gram-ve CAP  hess was d/aileen, passed TOV  flomax with h/o BPH - per wife - multiple UTIs.     Problem/Plan - 6:  Problem: ELENI (acute kidney injury). Plan: 2/2  pre-renal 2/2 sepsis/UTI   resolved.    Problem/Plan - 7:  ·  Problem: Pacemaker.  Plan: AV dual paced on EKG - tracing reviewed  NSVT, PVCs - Appreciate EP eval, pacemaker checked  ECHO - reviewed   Cardio recs appreciated.     Problem/Plan - 8:  ·  Problem: Alzheimer's disease of other onset.  Plan: hold donezepil/ aricept till pt able to take po meds.     Problem/Plan - 9:  ·  Problem: Essential hypertension.  Plan: monitor for now.     Problem/Plan - 10:  Problem: Coronary artery disease involving native coronary artery of native heart without angina pectoris. Plan; c/e meds.      PHYSICAL EXAM:    GENERAL: NAD  HEENT: PERRL, +EOMI  CHEST/LUNG: Clear to percussion bilaterally; No wheezing  HEART: S1S2+, Regular rate and rhythm; No murmurs, rubs, or gallops  ABDOMEN: Soft, Nontender, Nondistended  EXTREMITIES: No clubbing, cyanosis, or edema  NEURO: awake alert & talking today, answering questions, AAO x 3 today

## 2017-08-03 NOTE — DISCHARGE NOTE ADULT - MEDICATION SUMMARY - MEDICATIONS TO TAKE
I will START or STAY ON the medications listed below when I get home from the hospital:    acetaminophen 325 mg oral tablet  -- 2 tab(s) by mouth every 6 hours, As needed, Mild Pain (1 - 3)  -- Indication: For Pain    lisinopril  -- 20 milligram(s) by mouth once a day  -- Indication: For Htn    tamsulosin 0.4 mg oral capsule  -- 1 cap(s) by mouth once a day (at bedtime)  -- Indication: For bph    simvastatin  -- 40 milligram(s) by mouth once a day (at bedtime)  -- Indication: For Hld    pramipexole  -- 2 milligram(s) by mouth 2 times a day  -- Indication: For Parkinsons    Spiriva  -- 1 puff(s) inhaled once a day  -- Indication: For Sob    Ventolin  --  inhaled every 4 hours, As Needed  -- Indication: For Sob    Advair Diskus  --  inhaled   -- Indication: For Sob    donepezil 5 mg oral tablet  -- 1 tab(s) by mouth once a day (at bedtime)  -- Indication: For Dementia    magnesium oxide 400 mg (241.3 mg elemental magnesium) oral tablet  -- 1 tab(s) by mouth 3 times a day (with meals)  -- Indication: For nutrition    midodrine 2.5 mg oral tablet  -- 1 tab(s) by mouth once a day  -- Indication: For orthistatic hypotension I will START or STAY ON the medications listed below when I get home from the hospital:    acetaminophen 325 mg oral tablet  -- 2 tab(s) by mouth every 6 hours, As needed, Mild Pain (1 - 3)  -- Indication: For Pain    lisinopril  -- 20 milligram(s) by mouth once a day  -- Indication: For Htn    tamsulosin 0.4 mg oral capsule  -- 1 cap(s) by mouth once a day (at bedtime)  -- Indication: For bph    simvastatin  -- 40 milligram(s) by mouth once a day (at bedtime)  -- Indication: For Hld    Spiriva  -- 1 puff(s) inhaled once a day  -- Indication: For Sob    Ventolin  --  inhaled every 4 hours, As Needed  -- Indication: For Sob    Advair Diskus  --  inhaled   -- Indication: For Sob    donepezil 5 mg oral tablet  -- 1 tab(s) by mouth once a day (at bedtime)  -- Indication: For Dementia    magnesium oxide 400 mg (241.3 mg elemental magnesium) oral tablet  -- 1 tab(s) by mouth 3 times a day (with meals)  -- Indication: For nutrition    midodrine 2.5 mg oral tablet  -- 1 tab(s) by mouth once a day  -- Indication: For orthistatic hypotension

## 2017-08-03 NOTE — DISCHARGE NOTE ADULT - PLAN OF CARE
resolved 2/2 PNA & UTI. Antibiotics completed. Follow up with PMD within 1 week of discharge Follow up with PMD within 1 week of discharge continue lisinopril. Add lasix & beta blocker prn as tolerated as per PMD. Appears euvolemic for now. Follow up with PMD within 1 week of discharge.  New EF drop to 20% on ECHo  Ischemic eval and possible PPM upgrade to ICD as an outpatient once ID issues resolved as per cardiology  clinically euvolemic Avoid opiates. Follow up with PMD within 1 week of discharge & neurology Continue with home meds.  Follow up with PMD within 1 week of discharge Continue with home meds. Follow up with PMD within 1 week of discharge & neurology Follow up with PMD within 1 week of discharge. Also with orthostatic hypotension- maintain thigh high stockings & continue midodrine.

## 2017-08-03 NOTE — DISCHARGE NOTE ADULT - CARE PLAN
Principal Discharge DX:	Sepsis, due to unspecified organism  Goal:	resolved  Instructions for follow-up, activity and diet:	2/2 PNA & UTI. Antibiotics completed. Follow up with PMD within 1 week of discharge  Secondary Diagnosis:	Acute systolic CHF (congestive heart failure)  Secondary Diagnosis:	ELENI (acute kidney injury)  Secondary Diagnosis:	Encephalopathy  Secondary Diagnosis:	CAD (coronary artery disease)  Secondary Diagnosis:	Dementia  Secondary Diagnosis:	Essential hypertension Principal Discharge DX:	Sepsis, due to unspecified organism  Goal:	resolved  Instructions for follow-up, activity and diet:	2/2 PNA & UTI. Antibiotics completed. Follow up with PMD within 1 week of discharge  Secondary Diagnosis:	ELENI (acute kidney injury)  Instructions for follow-up, activity and diet:	Follow up with PMD within 1 week of discharge  Secondary Diagnosis:	Encephalopathy  Instructions for follow-up, activity and diet:	Avoid opiates. Follow up with PMD within 1 week of discharge & neurology  Secondary Diagnosis:	CAD (coronary artery disease)  Instructions for follow-up, activity and diet:	Continue with home meds.  Follow up with PMD within 1 week of discharge  Secondary Diagnosis:	Dementia  Instructions for follow-up, activity and diet:	Continue with home meds. Follow up with PMD within 1 week of discharge & neurology  Secondary Diagnosis:	Essential hypertension  Instructions for follow-up, activity and diet:	Follow up with PMD within 1 week of discharge  Secondary Diagnosis:	Acute systolic CHF (congestive heart failure)  Instructions for follow-up, activity and diet:	continue lisinopril. Add lasix & beta blocker prn as tolerated as per PMD. Appears euvolemic for now. Follow up with PMD within 1 week of discharge.  New EF drop to 20% on ECHo  Ischemic eval and possible PPM upgrade to ICD as an outpatient once ID issues resolved as per cardiology  clinically euvolemic Principal Discharge DX:	Sepsis, due to unspecified organism  Goal:	resolved  Instructions for follow-up, activity and diet:	2/2 PNA & UTI. Antibiotics completed. Follow up with PMD within 1 week of discharge  Secondary Diagnosis:	ELENI (acute kidney injury)  Instructions for follow-up, activity and diet:	Follow up with PMD within 1 week of discharge  Secondary Diagnosis:	Encephalopathy  Instructions for follow-up, activity and diet:	Avoid opiates. Follow up with PMD within 1 week of discharge & neurology  Secondary Diagnosis:	CAD (coronary artery disease)  Instructions for follow-up, activity and diet:	Continue with home meds.  Follow up with PMD within 1 week of discharge  Secondary Diagnosis:	Dementia  Instructions for follow-up, activity and diet:	Continue with home meds. Follow up with PMD within 1 week of discharge & neurology  Secondary Diagnosis:	Essential hypertension  Instructions for follow-up, activity and diet:	Follow up with PMD within 1 week of discharge. Also with orthostatic hypotension- maintain thigh high stockings & continue midodrine.  Secondary Diagnosis:	Acute systolic CHF (congestive heart failure)  Instructions for follow-up, activity and diet:	continue lisinopril. Add lasix & beta blocker prn as tolerated as per PMD. Appears euvolemic for now. Follow up with PMD within 1 week of discharge.  New EF drop to 20% on ECHo  Ischemic eval and possible PPM upgrade to ICD as an outpatient once ID issues resolved as per cardiology  clinically euvolemic

## 2017-08-03 NOTE — DISCHARGE NOTE ADULT - PATIENT PORTAL LINK FT
“You can access the [employer] Patient Portal, offered by [facility], by registering with the following website: [email or web address]”

## 2017-08-03 NOTE — DISCHARGE NOTE ADULT - MEDICATION SUMMARY - MEDICATIONS TO STOP TAKING
I will STOP taking the medications listed below when I get home from the hospital:    traMADol 50 mg oral tablet  -- 1 tab(s) by mouth every 4 hours, As Needed    methocarbamol 500 mg oral tablet  --  by mouth I will STOP taking the medications listed below when I get home from the hospital:    traMADol 50 mg oral tablet  -- 1 tab(s) by mouth every 4 hours, As Needed    pramipexole  -- 2 milligram(s) by mouth 2 times a day    methocarbamol 500 mg oral tablet  --  by mouth

## 2017-08-22 ENCOUNTER — APPOINTMENT (OUTPATIENT)
Dept: ELECTROPHYSIOLOGY | Facility: CLINIC | Age: 82
End: 2017-08-22

## 2017-10-19 PROCEDURE — 97116 GAIT TRAINING THERAPY: CPT

## 2017-10-19 PROCEDURE — 82746 ASSAY OF FOLIC ACID SERUM: CPT

## 2017-10-19 PROCEDURE — 92526 ORAL FUNCTION THERAPY: CPT

## 2017-10-19 PROCEDURE — 97110 THERAPEUTIC EXERCISES: CPT

## 2017-10-19 PROCEDURE — 87040 BLOOD CULTURE FOR BACTERIA: CPT

## 2017-10-19 PROCEDURE — 86592 SYPHILIS TEST NON-TREP QUAL: CPT

## 2017-10-19 PROCEDURE — 85730 THROMBOPLASTIN TIME PARTIAL: CPT

## 2017-10-19 PROCEDURE — 71045 X-RAY EXAM CHEST 1 VIEW: CPT

## 2017-10-19 PROCEDURE — 85610 PROTHROMBIN TIME: CPT

## 2017-10-19 PROCEDURE — 76775 US EXAM ABDO BACK WALL LIM: CPT

## 2017-10-19 PROCEDURE — 92610 EVALUATE SWALLOWING FUNCTION: CPT

## 2017-10-19 PROCEDURE — 97163 PT EVAL HIGH COMPLEX 45 MIN: CPT

## 2017-10-19 PROCEDURE — 84100 ASSAY OF PHOSPHORUS: CPT

## 2017-10-19 PROCEDURE — 93306 TTE W/DOPPLER COMPLETE: CPT

## 2017-10-19 PROCEDURE — 70450 CT HEAD/BRAIN W/O DYE: CPT

## 2017-10-19 PROCEDURE — 87186 SC STD MICRODIL/AGAR DIL: CPT

## 2017-10-19 PROCEDURE — 83605 ASSAY OF LACTIC ACID: CPT

## 2017-10-19 PROCEDURE — 94640 AIRWAY INHALATION TREATMENT: CPT

## 2017-10-19 PROCEDURE — 81001 URINALYSIS AUTO W/SCOPE: CPT

## 2017-10-19 PROCEDURE — 96374 THER/PROPH/DIAG INJ IV PUSH: CPT

## 2017-10-19 PROCEDURE — 83735 ASSAY OF MAGNESIUM: CPT

## 2017-10-19 PROCEDURE — 36415 COLL VENOUS BLD VENIPUNCTURE: CPT

## 2017-10-19 PROCEDURE — 80053 COMPREHEN METABOLIC PANEL: CPT

## 2017-10-19 PROCEDURE — 82607 VITAMIN B-12: CPT

## 2017-10-19 PROCEDURE — 93005 ELECTROCARDIOGRAM TRACING: CPT

## 2017-10-19 PROCEDURE — 84443 ASSAY THYROID STIM HORMONE: CPT

## 2017-10-19 PROCEDURE — 80048 BASIC METABOLIC PNL TOTAL CA: CPT

## 2017-10-19 PROCEDURE — 87086 URINE CULTURE/COLONY COUNT: CPT

## 2017-10-19 PROCEDURE — 99285 EMERGENCY DEPT VISIT HI MDM: CPT | Mod: 25

## 2017-10-19 PROCEDURE — 82803 BLOOD GASES ANY COMBINATION: CPT

## 2017-10-19 PROCEDURE — 97530 THERAPEUTIC ACTIVITIES: CPT

## 2017-10-19 PROCEDURE — 85027 COMPLETE CBC AUTOMATED: CPT

## 2017-10-19 PROCEDURE — 82962 GLUCOSE BLOOD TEST: CPT

## 2017-10-19 PROCEDURE — 96375 TX/PRO/DX INJ NEW DRUG ADDON: CPT

## 2017-11-17 ENCOUNTER — APPOINTMENT (OUTPATIENT)
Dept: PULMONOLOGY | Facility: CLINIC | Age: 82
End: 2017-11-17

## 2017-11-17 RX ORDER — METHOCARBAMOL 500 MG/1
0 TABLET, FILM COATED ORAL
Qty: 0 | Refills: 0 | COMMUNITY

## 2017-11-17 RX ORDER — FLUTICASONE PROPIONATE AND SALMETEROL 50; 250 UG/1; UG/1
0 POWDER ORAL; RESPIRATORY (INHALATION)
Qty: 0 | Refills: 0 | COMMUNITY

## 2017-11-21 ENCOUNTER — APPOINTMENT (OUTPATIENT)
Dept: ELECTROPHYSIOLOGY | Facility: CLINIC | Age: 82
End: 2017-11-21

## 2017-12-28 ENCOUNTER — CHART COPY (OUTPATIENT)
Age: 82
End: 2017-12-28

## 2018-02-20 ENCOUNTER — APPOINTMENT (OUTPATIENT)
Dept: ELECTROPHYSIOLOGY | Facility: CLINIC | Age: 83
End: 2018-02-20

## 2018-07-18 NOTE — H&P ADULT - FAMILY HISTORY
Can see Monday 7/23 at 10:30 or 3 PM.  If these times don't work for pt, he will probably need to see one of my colleagues.  My availability is very limited in the next few weeks.  
Can you please work patient in for a preop?    Also, patient states that he was in for a preop on 5/3/18 and he is wondering if he was charged for this visit. He states that nothing was done because Dr. Stovall told him he shouldn't have surgery due to his wife having a joint replacement.  
May 3rd patient was coming for pre op and told to postpone because wife had total knee replacement he was told to postponed to help his wife.  So he never did the pre op and still got charged for this.  Insurance paid and he paid and he never.  Wondering why this was still turned in if he recommended that he post poned the pre op.    Huddled with provider, patient got charged for a consult visit level 3 pre op is usually level 5.  We will speak with patient at his appointment on 07/23/18.  Darek Newman, CMA    
Reason for Call:  Other call back    Detailed comments: Patient would like to set up hernia surgery - looking for first week in Aug. Also states he doesn't need a pre op -     Phone Number Patient can be reached at: Cell number on file:    Telephone Information:   Mobile [phone]       Best Time: would like a call today     Can we leave a detailed message on this number? YES    Call taken on 7/17/2018 at 11:00 AM by Lia Bowen    
Scheduled pt 7/18/18 at 10:30 AM for a preop.  If he can't make it, please cancel the appointment.  We'll then try to find another option.  
Spoke with patient, he is unable to make appointment today at 10:30, please advise if there is another time that would work  Thanks  Zulma Hernández RT (R)         
Type of surgery: Laparoscopic inguinal hernia repair  Location of surgery: [facility]   Date of surgery: 8/3/18  Surgeon: Dr. Ireland  Pre-Op Appt Date: message sent to PCP  Post-Op Appt Date:  8/13/18  Packet sent out: Surgery packet mailed to patient's home address.   Pre-cert/Authorization completed: NA  Date: 7/17/2018    Shelley Pina  Surgery Scheduler    
No pertinent family history in first degree relatives
